# Patient Record
Sex: FEMALE | Race: WHITE | NOT HISPANIC OR LATINO | Employment: STUDENT | URBAN - NONMETROPOLITAN AREA
[De-identification: names, ages, dates, MRNs, and addresses within clinical notes are randomized per-mention and may not be internally consistent; named-entity substitution may affect disease eponyms.]

---
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
## 2019-04-07 ENCOUNTER — HOSPITAL ENCOUNTER (EMERGENCY)
Facility: HOSPITAL | Age: 19
Discharge: HOME | End: 2019-04-08
Attending: EMERGENCY MEDICINE | Admitting: EMERGENCY MEDICINE

## 2019-04-07 ENCOUNTER — APPOINTMENT (OUTPATIENT)
Dept: GENERAL RADIOLOGY | Facility: HOSPITAL | Age: 19
End: 2019-04-07

## 2019-04-07 DIAGNOSIS — J18.9 PNEUMONIA OF RIGHT UPPER LOBE DUE TO INFECTIOUS ORGANISM: Primary | ICD-10-CM

## 2019-04-07 LAB
ALBUMIN SERPL-MCNC: 4.7 G/DL (ref 3.5–5)
ALBUMIN/GLOB SERPL: 1.3 G/DL (ref 1–2)
ALP SERPL-CCNC: 80 U/L (ref 38–126)
ALT SERPL W P-5'-P-CCNC: 27 U/L (ref 13–69)
ANION GAP SERPL CALCULATED.3IONS-SCNC: 19 MMOL/L (ref 10–20)
AST SERPL-CCNC: 29 U/L (ref 15–46)
B-HCG UR QL: NEGATIVE
BACTERIA UR QL AUTO: ABNORMAL /HPF
BASOPHILS # BLD AUTO: 0.02 10*3/MM3 (ref 0–0.2)
BASOPHILS NFR BLD AUTO: 0.3 % (ref 0–1.5)
BILIRUB SERPL-MCNC: 1.4 MG/DL (ref 0.2–1.3)
BILIRUB UR QL STRIP: ABNORMAL
BUN BLD-MCNC: 10 MG/DL (ref 7–20)
BUN/CREAT SERPL: 14.3 (ref 7.1–23.5)
CALCIUM SPEC-SCNC: 9.9 MG/DL (ref 8.4–10.2)
CHLORIDE SERPL-SCNC: 96 MMOL/L (ref 98–107)
CLARITY UR: CLEAR
CO2 SERPL-SCNC: 26 MMOL/L (ref 26–30)
COLOR UR: ABNORMAL
CREAT BLD-MCNC: 0.7 MG/DL (ref 0.6–1.3)
DEPRECATED RDW RBC AUTO: 38.5 FL (ref 37–54)
EOSINOPHIL # BLD AUTO: 0 10*3/MM3 (ref 0–0.4)
EOSINOPHIL NFR BLD AUTO: 0 % (ref 0.3–6.2)
ERYTHROCYTE [DISTWIDTH] IN BLOOD BY AUTOMATED COUNT: 11.1 % (ref 12.3–15.4)
FLUAV AG NPH QL: NEGATIVE
FLUBV AG NPH QL IA: NEGATIVE
GFR SERPL CREATININE-BSD FRML MDRD: 107 ML/MIN/1.73
GLOBULIN UR ELPH-MCNC: 3.6 GM/DL
GLUCOSE BLD-MCNC: 91 MG/DL (ref 74–98)
GLUCOSE UR STRIP-MCNC: NEGATIVE MG/DL
HCT VFR BLD AUTO: 36.6 % (ref 34–46.6)
HETEROPH AB SER QL LA: NEGATIVE
HGB BLD-MCNC: 12.6 G/DL (ref 12–15.9)
HGB UR QL STRIP.AUTO: NEGATIVE
HYALINE CASTS UR QL AUTO: ABNORMAL /LPF
IMM GRANULOCYTES # BLD AUTO: 0.03 10*3/MM3 (ref 0–0.05)
IMM GRANULOCYTES NFR BLD AUTO: 0.5 % (ref 0–0.5)
KETONES UR QL STRIP: ABNORMAL
LEUKOCYTE ESTERASE UR QL STRIP.AUTO: ABNORMAL
LIPASE SERPL-CCNC: 214 U/L (ref 23–300)
LYMPHOCYTES # BLD AUTO: 0.87 10*3/MM3 (ref 0.7–3.1)
LYMPHOCYTES NFR BLD AUTO: 13.9 % (ref 19.6–45.3)
MCH RBC QN AUTO: 32.5 PG (ref 26.6–33)
MCHC RBC AUTO-ENTMCNC: 34.4 G/DL (ref 31.5–35.7)
MCV RBC AUTO: 94.3 FL (ref 79–97)
MONOCYTES # BLD AUTO: 0.73 10*3/MM3 (ref 0.1–0.9)
MONOCYTES NFR BLD AUTO: 11.6 % (ref 5–12)
MUCOUS THREADS URNS QL MICRO: ABNORMAL /HPF
NEUTROPHILS # BLD AUTO: 4.62 10*3/MM3 (ref 1.4–7)
NEUTROPHILS NFR BLD AUTO: 73.7 % (ref 42.7–76)
NITRITE UR QL STRIP: NEGATIVE
NRBC BLD AUTO-RTO: 0 /100 WBC (ref 0–0)
PH UR STRIP.AUTO: 6.5 [PH] (ref 5–8)
PLATELET # BLD AUTO: 195 10*3/MM3 (ref 140–450)
PMV BLD AUTO: 10.4 FL (ref 6–12)
POTASSIUM BLD-SCNC: 4 MMOL/L (ref 3.5–5.1)
PROT SERPL-MCNC: 8.3 G/DL (ref 6.3–8.2)
PROT UR QL STRIP: ABNORMAL
RBC # BLD AUTO: 3.88 10*6/MM3 (ref 3.77–5.28)
RBC # UR: ABNORMAL /HPF
REF LAB TEST METHOD: ABNORMAL
S PYO AG THROAT QL: NEGATIVE
SODIUM BLD-SCNC: 137 MMOL/L (ref 137–145)
SP GR UR STRIP: 1.02 (ref 1–1.03)
SQUAMOUS #/AREA URNS HPF: ABNORMAL /HPF
UROBILINOGEN UR QL STRIP: ABNORMAL
WBC NRBC COR # BLD: 6.27 10*3/MM3 (ref 3.4–10.8)
WBC UR QL AUTO: ABNORMAL /HPF

## 2019-04-07 PROCEDURE — 87804 INFLUENZA ASSAY W/OPTIC: CPT | Performed by: PHYSICIAN ASSISTANT

## 2019-04-07 PROCEDURE — 71046 X-RAY EXAM CHEST 2 VIEWS: CPT

## 2019-04-07 PROCEDURE — 99284 EMERGENCY DEPT VISIT MOD MDM: CPT

## 2019-04-07 PROCEDURE — 83690 ASSAY OF LIPASE: CPT | Performed by: PHYSICIAN ASSISTANT

## 2019-04-07 PROCEDURE — 96366 THER/PROPH/DIAG IV INF ADDON: CPT

## 2019-04-07 PROCEDURE — 80053 COMPREHEN METABOLIC PANEL: CPT | Performed by: PHYSICIAN ASSISTANT

## 2019-04-07 PROCEDURE — 86308 HETEROPHILE ANTIBODY SCREEN: CPT | Performed by: PHYSICIAN ASSISTANT

## 2019-04-07 PROCEDURE — 85025 COMPLETE CBC W/AUTO DIFF WBC: CPT | Performed by: PHYSICIAN ASSISTANT

## 2019-04-07 PROCEDURE — 96365 THER/PROPH/DIAG IV INF INIT: CPT

## 2019-04-07 PROCEDURE — 96375 TX/PRO/DX INJ NEW DRUG ADDON: CPT

## 2019-04-07 PROCEDURE — 87880 STREP A ASSAY W/OPTIC: CPT | Performed by: PHYSICIAN ASSISTANT

## 2019-04-07 PROCEDURE — 25010000002 LEVOFLOXACIN PER 250 MG: Performed by: PHYSICIAN ASSISTANT

## 2019-04-07 PROCEDURE — 81001 URINALYSIS AUTO W/SCOPE: CPT | Performed by: PHYSICIAN ASSISTANT

## 2019-04-07 PROCEDURE — 94640 AIRWAY INHALATION TREATMENT: CPT

## 2019-04-07 PROCEDURE — 25010000002 METHYLPREDNISOLONE PER 125 MG: Performed by: PHYSICIAN ASSISTANT

## 2019-04-07 PROCEDURE — 87081 CULTURE SCREEN ONLY: CPT | Performed by: PHYSICIAN ASSISTANT

## 2019-04-07 PROCEDURE — 81025 URINE PREGNANCY TEST: CPT | Performed by: PHYSICIAN ASSISTANT

## 2019-04-07 RX ORDER — IPRATROPIUM BROMIDE AND ALBUTEROL SULFATE 2.5; .5 MG/3ML; MG/3ML
3 SOLUTION RESPIRATORY (INHALATION) ONCE
Status: COMPLETED | OUTPATIENT
Start: 2019-04-07 | End: 2019-04-07

## 2019-04-07 RX ORDER — IBUPROFEN 200 MG
400 TABLET ORAL ONCE
Status: COMPLETED | OUTPATIENT
Start: 2019-04-07 | End: 2019-04-07

## 2019-04-07 RX ORDER — LEVOFLOXACIN 5 MG/ML
750 INJECTION, SOLUTION INTRAVENOUS ONCE
Status: COMPLETED | OUTPATIENT
Start: 2019-04-07 | End: 2019-04-08

## 2019-04-07 RX ORDER — SENNOSIDES 8.6 MG
650 CAPSULE ORAL EVERY 8 HOURS PRN
Qty: 12 TABLET | Refills: 0 | OUTPATIENT
Start: 2019-04-07 | End: 2020-05-04

## 2019-04-07 RX ORDER — PREDNISONE 20 MG/1
20 TABLET ORAL 2 TIMES DAILY
Qty: 10 TABLET | Refills: 0 | OUTPATIENT
Start: 2019-04-07 | End: 2020-05-04

## 2019-04-07 RX ORDER — ACETAMINOPHEN 325 MG/1
650 TABLET ORAL ONCE
Status: COMPLETED | OUTPATIENT
Start: 2019-04-07 | End: 2019-04-07

## 2019-04-07 RX ORDER — SODIUM CHLORIDE 0.9 % (FLUSH) 0.9 %
10 SYRINGE (ML) INJECTION AS NEEDED
Status: DISCONTINUED | OUTPATIENT
Start: 2019-04-07 | End: 2019-04-08 | Stop reason: HOSPADM

## 2019-04-07 RX ORDER — METHYLPREDNISOLONE SODIUM SUCCINATE 125 MG/2ML
125 INJECTION, POWDER, LYOPHILIZED, FOR SOLUTION INTRAMUSCULAR; INTRAVENOUS ONCE
Status: COMPLETED | OUTPATIENT
Start: 2019-04-07 | End: 2019-04-07

## 2019-04-07 RX ORDER — LEVOFLOXACIN 750 MG/1
750 TABLET ORAL DAILY
Qty: 10 TABLET | Refills: 0 | OUTPATIENT
Start: 2019-04-07 | End: 2020-05-04

## 2019-04-07 RX ADMIN — ACETAMINOPHEN 650 MG: 325 TABLET, FILM COATED ORAL at 22:06

## 2019-04-07 RX ADMIN — SODIUM CHLORIDE 1000 ML: 9 INJECTION, SOLUTION INTRAVENOUS at 21:23

## 2019-04-07 RX ADMIN — LEVOFLOXACIN 750 MG: 5 INJECTION, SOLUTION INTRAVENOUS at 22:06

## 2019-04-07 RX ADMIN — IPRATROPIUM BROMIDE AND ALBUTEROL SULFATE 3 ML: .5; 3 SOLUTION RESPIRATORY (INHALATION) at 21:35

## 2019-04-07 RX ADMIN — IBUPROFEN 400 MG: 200 TABLET, FILM COATED ORAL at 22:06

## 2019-04-07 RX ADMIN — METHYLPREDNISOLONE SODIUM SUCCINATE 125 MG: 125 INJECTION, POWDER, FOR SOLUTION INTRAMUSCULAR; INTRAVENOUS at 22:06

## 2019-04-07 NOTE — ED PROVIDER NOTES
Subjective   The patient is here with her family member with reported cough congestion some decreased energy, fatigue no nausea vomiting reported some subjective fever chills patient has been treated for suspected strep pharyngitis also bronchitis patient apparently has missed school this week presents here for further evaluation no abdominal pains reported        History provided by:  Patient      Review of Systems   Constitutional: Positive for activity change and fever.   HENT: Positive for congestion, rhinorrhea and sore throat.    Eyes: Negative.    Respiratory: Positive for cough. Negative for shortness of breath.    Cardiovascular: Negative for chest pain and leg swelling.   Gastrointestinal: Negative.  Negative for abdominal pain, diarrhea and vomiting.   Genitourinary: Negative.    Musculoskeletal: Positive for myalgias. Negative for neck pain and neck stiffness.   Skin: Negative.    Neurological: Negative.    Psychiatric/Behavioral: Negative.    All other systems reviewed and are negative.      Past Medical History:   Diagnosis Date   • Gallstones        Allergies   Allergen Reactions   • Penicillins        Past Surgical History:   Procedure Laterality Date   • CHOLECYSTECTOMY         Family History   Problem Relation Age of Onset   • Migraines Mother    • Osteoporosis Maternal Grandmother    • Stroke Maternal Grandmother    • Diabetes Maternal Grandfather    • Heart attack Maternal Grandfather    • Hypertension Maternal Grandfather    • Stroke Paternal Grandmother    • Diabetes Cousin    • Hypertension Father        Social History     Socioeconomic History   • Marital status: Single     Spouse name: Not on file   • Number of children: Not on file   • Years of education: Not on file   • Highest education level: Not on file   Tobacco Use   • Smoking status: Never Smoker   • Smokeless tobacco: Never Used   Substance and Sexual Activity   • Alcohol use: No   • Drug use: No   • Sexual activity: No            Objective   Physical Exam   Constitutional: She is oriented to person, place, and time. She appears well-developed and well-nourished. No distress.   Nontoxic well-appearing smiling no acute distress   HENT:   Head: Normocephalic and atraumatic.   Right Ear: External ear normal.   Left Ear: External ear normal.   Mouth/Throat: Oropharynx is clear and moist. No oropharyngeal exudate.   Eyes: Conjunctivae and EOM are normal. Pupils are equal, round, and reactive to light.   Neck: Normal range of motion. Neck supple.   No meningismus   Cardiovascular: Regular rhythm and intact distal pulses.   Pulmonary/Chest: Effort normal and breath sounds normal.   Abdominal: Soft. Bowel sounds are normal. There is no tenderness. There is no guarding.   Musculoskeletal: Normal range of motion. She exhibits no edema.   Lymphadenopathy:     She has no cervical adenopathy.   Neurological: She is alert and oriented to person, place, and time. No cranial nerve deficit or sensory deficit. She exhibits normal muscle tone. Coordination normal.   Skin: Skin is warm and dry. Capillary refill takes less than 2 seconds. No rash noted. She is not diaphoretic.   Psychiatric: She has a normal mood and affect. Her behavior is normal. Judgment and thought content normal.   Nursing note and vitals reviewed.      Procedures           ED Course  ED Course as of Apr 07 2224   Sun Apr 07, 2019 2003 Patient has now consented to do some laboratory assessment she had talked with family members before deciding will proceed with further evaluation  [SC]   2046 Patient is resting comfortably no acute distress  [SC]   2115 Patient's laboratory workup essentially reassuring,  Chest x-ray per radiology demonstrates a right upper lobe pneumonia... Patient has finished a Zithromax recently will start patient on Levaquin recommend close follow-up, [facility] or ER if symptoms do not improve or worsen    [SC]   2134 Pt case labs management reviewed  Dr Comer  [SC]   4731 Patient continues feeling well we will treat as mentioned with antibiotics discharged home under aunts care  Work excuse for the next 2 days  [SC]      ED Course User Index  [SC] Filemon Dolan PA-C                  MDM  Number of Diagnoses or Management Options     Amount and/or Complexity of Data Reviewed  Review and summarize past medical records: yes  Discuss the patient with other providers: yes    Risk of Complications, Morbidity, and/or Mortality  Presenting problems: low  Diagnostic procedures: low  Management options: low          Final diagnoses:   Pneumonia of right upper lobe due to infectious organism (CMS/[facility])            Filemon Dolan PA-C  04/07/19 2216       Filemon Dolan PA-C  04/07/19 2228

## 2019-04-08 VITALS
BODY MASS INDEX: 22.55 KG/M2 | WEIGHT: 148.8 LBS | RESPIRATION RATE: 18 BRPM | OXYGEN SATURATION: 96 % | HEART RATE: 92 BPM | TEMPERATURE: 98.6 F | DIASTOLIC BLOOD PRESSURE: 69 MMHG | HEIGHT: 68 IN | SYSTOLIC BLOOD PRESSURE: 118 MMHG

## 2019-04-09 ENCOUNTER — OFFICE VISIT (OUTPATIENT)
Dept: INTERNAL MEDICINE | Facility: CLINIC | Age: 19
End: 2019-04-09

## 2019-04-09 VITALS
OXYGEN SATURATION: 95 % | HEART RATE: 89 BPM | HEIGHT: 68 IN | DIASTOLIC BLOOD PRESSURE: 62 MMHG | SYSTOLIC BLOOD PRESSURE: 116 MMHG | BODY MASS INDEX: 22.58 KG/M2 | TEMPERATURE: 98.2 F | WEIGHT: 149 LBS

## 2019-04-09 DIAGNOSIS — J18.9 PNEUMONIA OF RIGHT UPPER LOBE DUE TO INFECTIOUS ORGANISM: Primary | ICD-10-CM

## 2019-04-09 PROCEDURE — 99213 OFFICE O/P EST LOW 20 MIN: CPT | Performed by: PHYSICIAN ASSISTANT

## 2019-04-09 NOTE — PROGRESS NOTES
Chief Complaint   Patient presents with   • Follow-up     Pt went to [facility] ER due to cough congestion some decreased energy, fatigue. Pt was given DX of pneumonia.        Subjective   Araseli Martins is a 19 y.o. female    History of Present Illness     Patient evaluated at [facility] on 4/7.  Strep and flu negative, labs and chest x-ray reviewed and she was diagnosed with right upper lobe pneumonia.  She was started on Levaquin 750 mg daily and prednisone.  Prior to diagnosis of pneumonia patient was treated for strep pharyngitis and bronchitis.  She completed Medrol Dosepak and azithromycin.  She presents today for follow-up on pneumonia.  She reports that since starting the medication she has noted improved energy.  She continues to have occasional cough and shortness of breath associated with the cough.  She denies any sputum production.  She has an albuterol inhaler at home but has not used it yet.      Past Medical History:   Diagnosis Date   • Gallstones      Past Surgical History:   Procedure Laterality Date   • CHOLECYSTECTOMY       Family History   Problem Relation Age of Onset   • Migraines Mother    • Osteoporosis Maternal Grandmother    • Stroke Maternal Grandmother    • Diabetes Maternal Grandfather    • Heart attack Maternal Grandfather    • Hypertension Maternal Grandfather    • Stroke Paternal Grandmother    • Diabetes Cousin    • Hypertension Father      Social History     Socioeconomic History   • Marital status: Single     Spouse name: Not on file   • Number of children: Not on file   • Years of education: Not on file   • Highest education level: Not on file   Tobacco Use   • Smoking status: Never Smoker   • Smokeless tobacco: Never Used   Substance and Sexual Activity   • Alcohol use: No   • Drug use: No   • Sexual activity: No     Allergies   Allergen Reactions   • Penicillins          Review of Systems   Constitutional: Negative for activity change, appetite change, chills,  diaphoresis, fatigue, fever, unexpected weight gain and unexpected weight loss.   HENT: Negative for congestion.    Respiratory: Positive for cough and shortness of breath. Negative for choking, chest tightness and wheezing.    Cardiovascular: Negative for chest pain and leg swelling.   Gastrointestinal: Negative for abdominal pain, constipation, diarrhea, nausea and vomiting.   Musculoskeletal: Negative for back pain.   Allergic/Immunologic: Negative for environmental allergies, food allergies and immunocompromised state.   Neurological: Negative for dizziness and light-headedness.     Objective     Vitals:    04/09/19 1404   BP: 116/62   Pulse: 89   Temp: 98.2 °F (36.8 °C)   SpO2: 95%       Physical Exam   Constitutional: She is oriented to person, place, and time. She appears well-developed and well-nourished. No distress.   HENT:   Head: Normocephalic and atraumatic.   Eyes: Conjunctivae and EOM are normal. Pupils are equal, round, and reactive to light.   Neck: Normal range of motion. Neck supple.   Cardiovascular: Normal rate, regular rhythm and normal heart sounds. Exam reveals no gallop and no friction rub.   No murmur heard.  Pulmonary/Chest: Effort normal and breath sounds normal. No respiratory distress. She has no wheezes. She has no rales.   Musculoskeletal: Normal range of motion. She exhibits no edema.   Neurological: She is alert and oriented to person, place, and time.   Skin: Skin is warm and dry. Capillary refill takes less than 2 seconds. She is not diaphoretic.   Psychiatric: She has a normal mood and affect. Her behavior is normal.   Nursing note and vitals reviewed.      Results for orders placed or performed during the hospital encounter of 04/07/19   Rapid Strep A Screen - Swab, Throat   Result Value Ref Range    Strep A Ag Negative Negative   Influenza Antigen, Rapid - Swab, Nasopharynx   Result Value Ref Range    Influenza A Ag, EIA Negative Negative    Influenza B Ag, EIA Negative  Negative   Comprehensive Metabolic Panel   Result Value Ref Range    Glucose 91 74 - 98 mg/dL    BUN 10 7 - 20 mg/dL    Creatinine 0.70 0.60 - 1.30 mg/dL    Sodium 137 137 - 145 mmol/L    Potassium 4.0 3.5 - 5.1 mmol/L    Chloride 96 (L) 98 - 107 mmol/L    CO2 26.0 26.0 - 30.0 mmol/L    Calcium 9.9 8.4 - 10.2 mg/dL    Total Protein 8.3 (H) 6.3 - 8.2 g/dL    Albumin 4.70 3.50 - 5.00 g/dL    ALT (SGPT) 27 13 - 69 U/L    AST (SGOT) 29 15 - 46 U/L    Alkaline Phosphatase 80 38 - 126 U/L    Total Bilirubin 1.4 (H) 0.2 - 1.3 mg/dL    eGFR Non African Amer 107 >60 mL/min/1.73    Globulin 3.6 gm/dL    A/G Ratio 1.3 1.0 - 2.0 g/dL    BUN/Creatinine Ratio 14.3 7.1 - 23.5    Anion Gap 19.0 10.0 - 20.0 mmol/L   Lipase   Result Value Ref Range    Lipase 214 23 - 300 U/L   Mononucleosis Screen   Result Value Ref Range    Monospot Negative Negative   Urinalysis With Culture If Indicated - Urine, Clean Catch   Result Value Ref Range    Color, UA Dark Yellow (A) Yellow, Straw    Appearance, UA Clear Clear    pH, UA 6.5 5.0 - 8.0    Specific Gravity, UA 1.025 1.005 - 1.030    Glucose, UA Negative Negative    Ketones, UA 40 mg/dL (2+) (A) Negative    Bilirubin, UA Small (1+) (A) Negative    Blood, UA Negative Negative    Protein, UA Trace (A) Negative    Leuk Esterase, UA Trace (A) Negative    Nitrite, UA Negative Negative    Urobilinogen, UA 1.0 E.U./dL 0.2 - 1.0 E.U./dL   Pregnancy, Urine - Urine, Clean Catch   Result Value Ref Range    HCG, Urine QL Negative Negative   CBC Auto Differential   Result Value Ref Range    WBC 6.27 3.40 - 10.80 10*3/mm3    RBC 3.88 3.77 - 5.28 10*6/mm3    Hemoglobin 12.6 12.0 - 15.9 g/dL    Hematocrit 36.6 34.0 - 46.6 %    MCV 94.3 79.0 - 97.0 fL    MCH 32.5 26.6 - 33.0 pg    MCHC 34.4 31.5 - 35.7 g/dL    RDW 11.1 (L) 12.3 - 15.4 %    RDW-SD 38.5 37.0 - 54.0 fl    MPV 10.4 6.0 - 12.0 fL    Platelets 195 140 - 450 10*3/mm3    Neutrophil % 73.7 42.7 - 76.0 %    Lymphocyte % 13.9 (L) 19.6 - 45.3 %     Monocyte % 11.6 5.0 - 12.0 %    Eosinophil % 0.0 (L) 0.3 - 6.2 %    Basophil % 0.3 0.0 - 1.5 %    Immature Grans % 0.5 0.0 - 0.5 %    Neutrophils, Absolute 4.62 1.40 - 7.00 10*3/mm3    Lymphocytes, Absolute 0.87 0.70 - 3.10 10*3/mm3    Monocytes, Absolute 0.73 0.10 - 0.90 10*3/mm3    Eosinophils, Absolute 0.00 0.00 - 0.40 10*3/mm3    Basophils, Absolute 0.02 0.00 - 0.20 10*3/mm3    Immature Grans, Absolute 0.03 0.00 - 0.05 10*3/mm3    nRBC 0.0 0.0 - 0.0 /100 WBC   Urinalysis, Microscopic Only - Urine, Clean Catch   Result Value Ref Range    RBC, UA 0-2 (A) None Seen /HPF    WBC, UA 3-5 (A) None Seen /HPF    Bacteria, UA 1+ (A) None Seen /HPF    Squamous Epithelial Cells, UA 0-2 None Seen, 0-2 /HPF    Hyaline Casts, UA None Seen None Seen /LPF    Mucus, UA Large/3+ (A) None Seen, Trace /HPF    Methodology Manual Light Microscopy        Assessment/Plan     Araseli was seen today for follow-up.    Diagnoses and all orders for this visit:    Pneumonia of right upper lobe due to infectious organism (CMS/[facility])  Chest x-ray reviewed, consistent with right upper lobe pneumonia.  Patient reports symptoms are significantly better since she started antibiotics on Sunday.  Continue Levaquin and prednisone.  Have encouraged patient to use albuterol inhaler as needed for shortness of breath.  Continue to push water and rest.  Return to clinic if symptoms are not improving or if they worsen.      Return if symptoms worsen or fail to improve.    Priya Ley PA-C

## 2019-04-10 LAB — BACTERIA SPEC AEROBE CULT: NORMAL

## 2020-07-09 PROCEDURE — U0004 COV-19 TEST NON-CDC HGH THRU: HCPCS | Performed by: PHYSICIAN ASSISTANT

## 2020-07-09 PROCEDURE — U0002 COVID-19 LAB TEST NON-CDC: HCPCS | Performed by: PHYSICIAN ASSISTANT

## 2021-10-04 ENCOUNTER — OFFICE VISIT (OUTPATIENT)
Dept: INTERNAL MEDICINE | Facility: CLINIC | Age: 21
End: 2021-10-04

## 2021-10-04 VITALS
WEIGHT: 171 LBS | HEART RATE: 77 BPM | HEIGHT: 70 IN | OXYGEN SATURATION: 99 % | TEMPERATURE: 98.6 F | SYSTOLIC BLOOD PRESSURE: 137 MMHG | DIASTOLIC BLOOD PRESSURE: 87 MMHG | BODY MASS INDEX: 24.48 KG/M2 | RESPIRATION RATE: 15 BRPM

## 2021-10-04 DIAGNOSIS — R17 ELEVATED BILIRUBIN: ICD-10-CM

## 2021-10-04 DIAGNOSIS — Z13.21 SCREENING FOR ENDOCRINE, NUTRITIONAL, METABOLIC AND IMMUNITY DISORDER: ICD-10-CM

## 2021-10-04 DIAGNOSIS — R19.7 DIARRHEA, UNSPECIFIED TYPE: ICD-10-CM

## 2021-10-04 DIAGNOSIS — Z13.0 SCREENING FOR ENDOCRINE, NUTRITIONAL, METABOLIC AND IMMUNITY DISORDER: ICD-10-CM

## 2021-10-04 DIAGNOSIS — Z13.29 SCREENING FOR ENDOCRINE, NUTRITIONAL, METABOLIC AND IMMUNITY DISORDER: ICD-10-CM

## 2021-10-04 DIAGNOSIS — Z11.59 NEED FOR HEPATITIS C SCREENING TEST: ICD-10-CM

## 2021-10-04 DIAGNOSIS — Z00.00 PHYSICAL EXAM, ANNUAL: Primary | ICD-10-CM

## 2021-10-04 DIAGNOSIS — Z13.228 SCREENING FOR ENDOCRINE, NUTRITIONAL, METABOLIC AND IMMUNITY DISORDER: ICD-10-CM

## 2021-10-04 PROCEDURE — 99395 PREV VISIT EST AGE 18-39: CPT | Performed by: NURSE PRACTITIONER

## 2021-10-04 RX ORDER — DICYCLOMINE HYDROCHLORIDE 10 MG/1
10 CAPSULE ORAL
Qty: 90 CAPSULE | Refills: 1 | Status: SHIPPED | OUTPATIENT
Start: 2021-10-04 | End: 2021-11-23

## 2021-10-04 NOTE — PATIENT INSTRUCTIONS

## 2021-10-04 NOTE — PROGRESS NOTES
Chief Complaint   Patient presents with   • Abdominal Pain     nausea and diarrhea. x 8 years. no blood.    • Annual Exam       Araseli Martins is a 21 y.o. female and is here for a comprehensive physical exam and to reestablish care. The patient reports problems - Which include stomach related issues as she has diarrhea and states that she has noticed that certain foods have made her symptoms worse and she has tried to stay away from that.  She has been checked for celiac in the past along with H. pylori and also had gastrointestinal panel.  She denies any history of irritable bowel that she is aware of and does not feel that it is stress related.  She has noticed correlation with certain foods but cannot pinpoint what is making it worse.  She does have family history of celiac disease.  Denies any blood in stool currently..  Patient denies any fever or chills.  She does not have a gallbladder.  Patient denies any substance abuse and does report that she drinks about 3 glasses of alcohol per week.     History:  LMP: Patient's last menstrual period was 2021.  Last pap date:not reported   Abnormal pap? no  : 0  Para: 0  STD:none  Age of menarche:11  Sexually active:17 (males)  Abuse:none  Has boyfriend  Do you take any herbs or supplements that were not prescribed by a doctor? no  Are you taking calcium supplements? no  Are you taking aspirin daily? no      Health Habits:  Dental Exam. not up to date - Advised patient to schedule  Eye Exam. not up to date - Advised patient to schedule  Exercise: 5 times/week.  Current exercise activities include: walking    Health Maintenance   Topic Date Due   • ANNUAL PHYSICAL  Never done   • COVID-19 Vaccine (1) Never done   • HPV VACCINES (2 - 3-dose series) 2015   • HEPATITIS C SCREENING  Never done   • PAP SMEAR  Never done   • TDAP/TD VACCINES (2 - Td or Tdap) 2020   • INFLUENZA VACCINE  Never done   • Pneumococcal Vaccine 0-64  Completed   •  MENINGOCOCCAL VACCINE  Completed       PMH, PSH, SocHx, FamHx, Allergies, and Medications: Reviewed and updated in the Visit Navigator.     Allergies   Allergen Reactions   • Penicillins Other (See Comments)     Unsure of reaction      Past Medical History:   Diagnosis Date   • Gallstones      Past Surgical History:   Procedure Laterality Date   • CHOLECYSTECTOMY       Social History     Socioeconomic History   • Marital status: Single     Spouse name: Not on file   • Number of children: Not on file   • Years of education: Not on file   • Highest education level: Not on file   Tobacco Use   • Smoking status: Never Smoker   • Smokeless tobacco: Never Used   Vaping Use   • Vaping Use: Never used   Substance and Sexual Activity   • Alcohol use: No   • Drug use: No   • Sexual activity: Never     Family History   Problem Relation Age of Onset   • Migraines Mother    • Osteoporosis Maternal Grandmother    • Stroke Maternal Grandmother    • Diabetes Maternal Grandfather    • Heart attack Maternal Grandfather    • Hypertension Maternal Grandfather    • Stroke Paternal Grandmother    • Diabetes Cousin    • Hypertension Father        Review of Systems  Review of Systems   Constitutional: Positive for fatigue. Negative for chills, fever, unexpected weight gain and unexpected weight loss.   HENT: Negative for congestion, ear pain, hearing loss, rhinorrhea, sinus pressure, sneezing, sore throat and trouble swallowing.    Eyes: Negative for discharge and itching.   Respiratory: Negative for cough, chest tightness and shortness of breath.    Cardiovascular: Negative for chest pain, palpitations and leg swelling.   Gastrointestinal: Positive for abdominal pain and diarrhea. Negative for blood in stool, constipation and vomiting.   Endocrine: Negative for polydipsia and polyuria.   Genitourinary: Negative for difficulty urinating, dysuria, frequency, hematuria, urgency and urinary incontinence.   Musculoskeletal: Negative for  "arthralgias, back pain, gait problem and joint swelling.   Skin: Negative for rash and bruise.   Allergic/Immunologic: Positive for environmental allergies. Negative for immunocompromised state.   Neurological: Negative for dizziness, syncope, weakness, light-headedness, numbness and headache.   Hematological: Does not bruise/bleed easily.   Psychiatric/Behavioral: Positive for stress. Negative for behavioral problems, dysphoric mood and sleep disturbance. The patient is nervous/anxious.          Vitals:    10/04/21 1559   BP: 137/87   Pulse: 77   Resp: 15   Temp: 98.6 °F (37 °C)   SpO2: 99%       Objective   /87   Pulse 77   Temp 98.6 °F (37 °C)   Resp 15   Ht 177.8 cm (70\")   Wt 77.6 kg (171 lb)   LMP 09/13/2021   SpO2 99%   BMI 24.54 kg/m²   PHQ-9 Depression Screening  Little interest or pleasure in doing things? 1   Feeling down, depressed, or hopeless? 1   Trouble falling or staying asleep, or sleeping too much? 2   Feeling tired or having little energy? 2   Poor appetite or overeating? 0   Feeling bad about yourself - or that you are a failure or have let yourself or your family down? 1   Trouble concentrating on things, such as reading the newspaper or watching television? 2   Moving or speaking so slowly that other people could have noticed? Or the opposite - being so fidgety or restless that you have been moving around a lot more than usual? 0   Thoughts that you would be better off dead, or of hurting yourself in some way? 0   PHQ-9 Total Score 9   If you checked off any problems, how difficult have these problems made it for you to do your work, take care of things at home, or get along with other people? Not difficult at all     Anxiety SANDRA-7 10/4/2021   Feeling nervous, anxious or on edge 1   Not being able to stop or control worrying 1   Worrying too much about different things 1   Trouble Relaxing 1   Being so restless that it is hard to sit still 0   Becoming easily annoyed or irritable " 1   Feeling afraid as if something awful might happen 1   SANDRA 7 Total Score 6   If you checked any problems, how difficult have these problems made it for you to do your work, take care of things at home, or get along with other people Somewhat difficult     Physical Exam  Vitals and nursing note reviewed.   Constitutional:       General: She is not in acute distress.     Appearance: Normal appearance. She is well-developed.   HENT:      Head: Normocephalic and atraumatic.      Right Ear: Hearing, ear canal and external ear normal. Tympanic membrane is erythematous and bulging.      Left Ear: Hearing, ear canal and external ear normal. Tympanic membrane is erythematous and bulging.      Nose: Mucosal edema present. No rhinorrhea.      Right Sinus: No maxillary sinus tenderness or frontal sinus tenderness.      Left Sinus: No maxillary sinus tenderness or frontal sinus tenderness.      Mouth/Throat:      Mouth: Mucous membranes are dry.      Dentition: Normal dentition.      Pharynx: Posterior oropharyngeal erythema present.      Comments: PND    Eyes:      Conjunctiva/sclera: Conjunctivae normal.      Pupils: Pupils are equal, round, and reactive to light.   Neck:      Thyroid: No thyroid mass or thyromegaly.      Vascular: No carotid bruit or JVD.   Cardiovascular:      Rate and Rhythm: Normal rate and regular rhythm.      Heart sounds: Normal heart sounds, S1 normal and S2 normal. No murmur heard.     Pulmonary:      Effort: Pulmonary effort is normal. No respiratory distress.      Breath sounds: Normal breath sounds.   Abdominal:      General: Bowel sounds are normal. There is no distension or abdominal bruit.      Palpations: Abdomen is soft. There is no mass.      Tenderness: There is generalized abdominal tenderness and tenderness in the right lower quadrant. There is no right CVA tenderness, left CVA tenderness, guarding or rebound. Negative signs include Coy's sign, Rovsing's sign, McBurney's sign, psoas  sign and obturator sign.      Hernia: No hernia is present.   Genitourinary:     Comments: deferred  Musculoskeletal:         General: Normal range of motion.      Cervical back: Normal range of motion and neck supple.   Lymphadenopathy:      Head:      Right side of head: No submental, submandibular or tonsillar adenopathy.      Left side of head: No submental, submandibular or tonsillar adenopathy.      Cervical: No cervical adenopathy.   Skin:     General: Skin is warm and dry.      Capillary Refill: Capillary refill takes less than 2 seconds.      Findings: No rash.      Nails: There is no clubbing.   Neurological:      Mental Status: She is alert and oriented to person, place, and time.      Cranial Nerves: No cranial nerve deficit.      Sensory: No sensory deficit.      Gait: Gait normal.   Psychiatric:         Behavior: Behavior normal.         Thought Content: Thought content normal.         Judgment: Judgment normal.              Assessment/Plan   1. Healthy female exam.    2. Patient Counseling: Including but not Limited to the following, when appropriate:  --Nutrition: Stressed importance of moderation in sodium/caffeine intake, saturated fat and cholesterol, caloric balance, sufficient intake of fresh fruits, vegetables, fiber, calcium, iron, and 1 mg of folate supplement per day (for females capable of pregnancy).  --Discussed the issue of estrogen replacement, calcium supplement, and the daily use of baby aspirin.  --Exercise: Stressed the importance of regular exercise.   --Substance Abuse: Discussed cessation/primary prevention of tobacco, alcohol, or other drug use; driving or other dangerous activities under the influence; availability of treatment for abuse, as indicated based on social history.    --Sexuality: Discussed sexually transmitted diseases, partner selection, use of condoms, avoidance of unintended pregnancy  and contraceptive alternatives.   --Injury prevention: Discussed safety belts,  safety helmets, smoke detector, smoking near bedding or upholstery.   --Dental health: Discussed importance of regular tooth brushing, flossing, and dental visits.  --Immunizations reviewed.  --Discussed benefits of regular vision and dental screening       3. Discussed the patient's BMI with her.  The BMI is in the acceptable range  4. No follow-ups on file.  5. Age-appropriate Screening Scheduled      Assessment/Plan     Diagnoses and all orders for this visit:    1. Physical exam, annual (Primary)  -     Comprehensive metabolic panel  -     Lipid panel  -     CBC w AUTO Differential    2. Elevated bilirubin  -     Comprehensive metabolic panel  -     Ambulatory Referral to Gastroenterology    3. Diarrhea, unspecified type  -     Celiac Disease Panel  -     Inflammatory Bowel Disease Panel  -     dicyclomine (Bentyl) 10 MG capsule; Take 1 capsule by mouth 4 (Four) Times a Day Before Meals & at Bedtime.  Dispense: 90 capsule; Refill: 1  -     Ambulatory Referral to Gastroenterology    4. Screening for endocrine, nutritional, metabolic and immunity disorder  -     Hemoglobin A1c  -     TSH  -     T4, free  -     Vitamin B12  -     Vitamin D 25 hydroxy    5. Need for hepatitis C screening test  -     Hepatitis C Antibody                 Karlene Monterroso, APRN 10/04/2021

## 2021-10-18 LAB
25(OH)D3+25(OH)D2 SERPL-MCNC: 41.9 NG/ML (ref 30–100)
ALBUMIN SERPL-MCNC: 4.9 G/DL (ref 3.9–5)
ALBUMIN/GLOB SERPL: 1.8 {RATIO} (ref 1.2–2.2)
ALP SERPL-CCNC: 60 IU/L (ref 44–121)
ALT SERPL-CCNC: 15 IU/L (ref 0–32)
AST SERPL-CCNC: 13 IU/L (ref 0–40)
BAKER'S YEAST IGA QN: <20 UNITS (ref 0–24.9)
BAKER'S YEAST IGG QN: <20 UNITS (ref 0–24.9)
BASOPHILS # BLD AUTO: 0 X10E3/UL (ref 0–0.2)
BASOPHILS NFR BLD AUTO: 1 %
BILIRUB SERPL-MCNC: 2.1 MG/DL (ref 0–1.2)
BUN SERPL-MCNC: 8 MG/DL (ref 6–20)
BUN/CREAT SERPL: 11 (ref 9–23)
CALCIUM SERPL-MCNC: 10 MG/DL (ref 8.7–10.2)
CHLORIDE SERPL-SCNC: 102 MMOL/L (ref 96–106)
CHOLEST SERPL-MCNC: 192 MG/DL (ref 100–199)
CO2 SERPL-SCNC: 22 MMOL/L (ref 20–29)
CREAT SERPL-MCNC: 0.72 MG/DL (ref 0.57–1)
ENDOMYSIUM IGA SER QL: NEGATIVE
EOSINOPHIL # BLD AUTO: 0.1 X10E3/UL (ref 0–0.4)
EOSINOPHIL NFR BLD AUTO: 1 %
ERYTHROCYTE [DISTWIDTH] IN BLOOD BY AUTOMATED COUNT: 11.7 % (ref 11.7–15.4)
GLOBULIN SER CALC-MCNC: 2.8 G/DL (ref 1.5–4.5)
GLUCOSE SERPL-MCNC: 87 MG/DL (ref 65–99)
HBA1C MFR BLD: 4.9 % (ref 4.8–5.6)
HCT VFR BLD AUTO: 36.2 % (ref 34–46.6)
HCV AB S/CO SERPL IA: <0.1 S/CO RATIO (ref 0–0.9)
HDLC SERPL-MCNC: 68 MG/DL
HGB BLD-MCNC: 12 G/DL (ref 11.1–15.9)
IGA SERPL-MCNC: 159 MG/DL (ref 87–352)
IMM GRANULOCYTES # BLD AUTO: 0 X10E3/UL (ref 0–0.1)
IMM GRANULOCYTES NFR BLD AUTO: 0 %
LDLC SERPL CALC-MCNC: 104 MG/DL (ref 0–99)
LYMPHOCYTES # BLD AUTO: 2 X10E3/UL (ref 0.7–3.1)
LYMPHOCYTES NFR BLD AUTO: 35 %
MCH RBC QN AUTO: 31.4 PG (ref 26.6–33)
MCHC RBC AUTO-ENTMCNC: 33.1 G/DL (ref 31.5–35.7)
MCV RBC AUTO: 95 FL (ref 79–97)
MONOCYTES # BLD AUTO: 0.4 X10E3/UL (ref 0.1–0.9)
MONOCYTES NFR BLD AUTO: 7 %
NEUTROPHILS # BLD AUTO: 3.2 X10E3/UL (ref 1.4–7)
NEUTROPHILS NFR BLD AUTO: 56 %
P-ANCA ATYPICAL TITR SER IF: NORMAL TITER
PLATELET # BLD AUTO: 265 X10E3/UL (ref 150–450)
POTASSIUM SERPL-SCNC: 4.4 MMOL/L (ref 3.5–5.2)
PROT SERPL-MCNC: 7.7 G/DL (ref 6–8.5)
RBC # BLD AUTO: 3.82 X10E6/UL (ref 3.77–5.28)
SODIUM SERPL-SCNC: 139 MMOL/L (ref 134–144)
T4 FREE SERPL-MCNC: 1.28 NG/DL (ref 0.82–1.77)
TRIGL SERPL-MCNC: 115 MG/DL (ref 0–149)
TSH SERPL DL<=0.005 MIU/L-ACNC: 3.47 UIU/ML (ref 0.45–4.5)
TTG IGA SER-ACNC: <2 U/ML (ref 0–3)
VIT B12 SERPL-MCNC: 725 PG/ML (ref 232–1245)
VLDLC SERPL CALC-MCNC: 20 MG/DL (ref 5–40)
WBC # BLD AUTO: 5.8 X10E3/UL (ref 3.4–10.8)

## 2021-11-10 ENCOUNTER — OFFICE VISIT (OUTPATIENT)
Dept: GASTROENTEROLOGY | Facility: CLINIC | Age: 21
End: 2021-11-10

## 2021-11-10 VITALS
TEMPERATURE: 99.1 F | SYSTOLIC BLOOD PRESSURE: 119 MMHG | DIASTOLIC BLOOD PRESSURE: 73 MMHG | HEART RATE: 74 BPM | RESPIRATION RATE: 12 BRPM | BODY MASS INDEX: 25.33 KG/M2 | HEIGHT: 69 IN | WEIGHT: 171 LBS

## 2021-11-10 DIAGNOSIS — R17 ELEVATED BILIRUBIN: ICD-10-CM

## 2021-11-10 DIAGNOSIS — R10.9 ABDOMINAL CRAMPING: ICD-10-CM

## 2021-11-10 DIAGNOSIS — R11.0 NAUSEA: Primary | ICD-10-CM

## 2021-11-10 DIAGNOSIS — R19.7 DIARRHEA, UNSPECIFIED TYPE: ICD-10-CM

## 2021-11-10 PROCEDURE — 99203 OFFICE O/P NEW LOW 30 MIN: CPT | Performed by: PHYSICIAN ASSISTANT

## 2021-11-10 RX ORDER — L.ACIDOPHIL,PARACAS,B.ANIMALIS 12B CELL
1 CAPSULE ORAL DAILY
Qty: 10 CAPSULE | Refills: 0 | COMMUNITY
Start: 2021-11-10 | End: 2022-08-22

## 2021-11-10 NOTE — PATIENT INSTRUCTIONS
Low-FODMAP Eating Plan    FODMAP stands for fermentable oligosaccharides, disaccharides, monosaccharides, and polyols. These are sugars that are hard for some people to digest. A low-FODMAP eating plan may help some people who have irritable bowel syndrome (IBS) and certain other bowel (intestinal) diseases to manage their symptoms.  This meal plan can be complicated to follow. Work with a diet and nutrition specialist (dietitian) to make a low-FODMAP eating plan that is right for you. A dietitian can help make sure that you get enough nutrition from this diet.  What are tips for following this plan?  Reading food labels  · Check labels for hidden FODMAPs such as:  ? High-fructose syrup.  ? Honey.  ? Agave.  ? Natural fruit flavors.  ? Onion or garlic powder.  · Choose low-FODMAP foods that contain 3-4 grams of fiber per serving.  · Check food labels for serving sizes. Eat only one serving at a time to make sure FODMAP levels stay low.  Shopping  · Shop with a list of foods that are recommended on this diet and make a meal plan.  Meal planning  · Follow a low-FODMAP eating plan for up to 6 weeks, or as told by your health care provider or dietitian.  · To follow the eating plan:  1. Eliminate high-FODMAP foods from your diet completely. Choose only low-FODMAP foods to eat. You will do this for 2-6 weeks.  2. Gradually reintroduce high-FODMAP foods into your diet one at a time. Most people should wait a few days before introducing the next new high-FODMAP food into their meal plan. Your dietitian can recommend how quickly you may reintroduce foods.  3. Keep a daily record of what and how much you eat and drink. Make note of any symptoms that you have after eating.  4. Review your daily record with a dietitian regularly to identify which foods you can eat and which foods you should avoid.  General tips  · Drink enough fluid each day to keep your urine pale yellow.  · Avoid processed foods. These often have added sugar  "and may be high in FODMAPs.  · Avoid most dairy products, whole grains, and sweeteners.  · Work with a dietitian to make sure you get enough fiber in your diet.  · Avoid high FODMAP foods at meals to manage symptoms.  Recommended foods  Fruits  Bananas, oranges, tangerines, flako, limes, blueberries, raspberries, strawberries, grapes, cantaloupe, honeydew melon, kiwi, papaya, passion fruit, and pineapple. Limited amounts of dried cranberries, banana chips, and shredded coconut.  Vegetables  Eggplant, zucchini, cucumber, peppers, green beans, bean sprouts, lettuce, arugula, kale, Swiss chard, spinach, antoinette greens, bok malina, summer squash, potato, and tomato. Limited amounts of corn, carrot, and sweet potato. Green parts of scallions.  Grains  Gluten-free grains, such as rice, oats, buckwheat, quinoa, corn, polenta, and millet. Gluten-free pasta, bread, or cereal. Rice noodles. Corn tortillas.  Meats and other proteins  Unseasoned beef, pork, poultry, or fish. Eggs. Sharpe. Tofu (firm) and tempeh. Limited amounts of nuts and seeds, such as almonds, walnuts, brazil nuts, pecans, peanuts, nut butters, pumpkin seeds, daily seeds, and sunflower seeds.  Dairy  Lactose-free milk, yogurt, and kefir. Lactose-free cottage cheese and ice cream. Non-dairy milks, such as almond, coconut, hemp, and rice milk. Non-dairy yogurt. Limited amounts of goat cheese, brie, mozzarella, parmesan, swiss, and other hard cheeses.  Fats and oils  Butter-free spreads. Vegetable oils, such as olive, canola, and sunflower oil.  Seasoning and other foods  Artificial sweeteners with names that do not end in \"ol,\" such as aspartame, saccharine, and stevia. Maple syrup, white table sugar, raw sugar, brown sugar, and molasses. Mayonnaise, soy sauce, and tamari. Fresh basil, coriander, parsley, rosemary, and thyme.  Beverages  Water and mineral water. Sugar-sweetened soft drinks. Small amounts of orange juice or cranberry juice. Black and green tea. " Most dry verna. Coffee.  The items listed above may not be a complete list of foods and beverages you can eat. Contact a dietitian for more information.  Foods to avoid  Fruits  Fresh, dried, and juiced forms of apple, pear, watermelon, peach, plum, cherries, apricots, blackberries, boysenberries, figs, nectarines, and abhishek. Avocado.  Vegetables  Chicory root, artichoke, asparagus, cabbage, snow peas, [place]y sprouts, broccoli, sugar snap peas, mushrooms, celery, and cauliflower. Onions, garlic, leeks, and the white part of scallions.  Grains  Wheat, including kamut, durum, and semolina. Barley and bulgur. Couscous. Wheat-based cereals. Wheat noodles, bread, crackers, and pastries.  Meats and other proteins  Fried or fatty meat. Sausage. Cashews and pistachios. Soybeans, baked beans, black beans, chickpeas, kidney beans, luna beans, navy beans, lentils, black-eyed peas, and split peas.  Dairy  Milk, yogurt, ice cream, and soft cheese. Cream and sour cream. Milk-based sauces. Custard. Buttermilk. Soy milk.  Seasoning and other foods  Any sugar-free gum or candy. Foods that contain artificial sweeteners such as sorbitol, mannitol, isomalt, or xylitol. Foods that contain honey, high-fructose corn syrup, or agave. Bouillon, vegetable stock, beef stock, and chicken stock. Garlic and onion powder. Condiments made with onion, such as hummus, chutney, pickles, relish, salad dressing, and salsa. Tomato paste.  Beverages  Chicory-based drinks. Coffee substitutes. Chamomile tea. Fennel tea. Sweet or fortified verna such as port or amari. Diet soft drinks made with isomalt, mannitol, maltitol, sorbitol, or xylitol. Apple, pear, and abhishek juice. Juices with high-fructose corn syrup.  The items listed above may not be a complete list of foods and beverages you should avoid. Contact a dietitian for more information.  Summary  · FODMAP stands for fermentable oligosaccharides, disaccharides, monosaccharides, and polyols. These  are sugars that are hard for some people to digest.  · A low-FODMAP eating plan is a short-term diet that helps to ease symptoms of certain bowel diseases.  · The eating plan usually lasts up to 6 weeks. After that, high-FODMAP foods are reintroduced gradually and one at a time. This can help you find out which foods may be causing symptoms.  · A low-FODMAP eating plan can be complicated. It is best to work with a dietitian who has experience with this type of plan.  This information is not intended to replace advice given to you by your health care provider. Make sure you discuss any questions you have with your health care provider.  Document Revised: 05/06/2021 Document Reviewed: 05/06/2021  Elsevier Patient Education © 2021 [employer]

## 2021-11-10 NOTE — PROGRESS NOTES
New Patient Consult      Date: 11/10/2021   Patient Name: Araseli Martins  MRN: [id number]  : 2000     Primary Care Provider: Karlene Monterroso APRN  Referring Provider: Loc    Chief Complaint   Patient presents with   • Diarrhea   • Elevated bilirubin     History of Present Illness: Araseli Martins is a 21 y.o. female who is here today as a consultation with Gastroenterology for evaluation of Diarrhea and Elevated bilirubin.    She has nausea after eating, then abdominal cramping then diarrhea. This occurs about 50% of the time with fecal urgency. She has 1-2 BMs per day and typically formed other than with her episodes. She has been having these episodes for about 5 years now, remained the same. Sometimes does have bright red blood on the tissue after a bowel movement, has had an anal fissure in the past diagnosed by her PCP. No current rectal pain. She has tried dicyclomine for relief of abdominal pain, has not seemed to help yet. Has had elevated bilirubin on her labs for years now. No prior EGD or colonoscopy. There is no known family history of colon cancer or colon polyps. Had recent labs with PCP recently which were all normal except for elevated bilirubin. No tobacco or marijuana use. Drinks alcohol socially in small amounts, no daily use. Had imaging of her liver in 2018 which was normal, no abdominal imaging since then.     Subjective      Past Medical History:   Diagnosis Date   • Anemia    • Gallstones    • Iron deficiency    • Snores      Past Surgical History:   Procedure Laterality Date   • CHOLECYSTECTOMY      at age 7, + stones     Family History   Problem Relation Age of Onset   • Migraines Mother    • Osteoporosis Maternal Grandmother    • Stroke Maternal Grandmother    • Diabetes Maternal Grandfather    • Heart attack Maternal Grandfather    • Hypertension Maternal Grandfather    • Stroke Paternal Grandmother    • Diabetes Cousin    • Hypertension Father    • Colon cancer Neg Hx       Social History     Socioeconomic History   • Marital status: Single   Tobacco Use   • Smoking status: Never Smoker   • Smokeless tobacco: Never Used   Vaping Use   • Vaping Use: Never used   Substance and Sexual Activity   • Alcohol use: Yes     Comment: social   • Drug use: Never   • Sexual activity: Defer     Current Outpatient Medications:   •  dicyclomine (Bentyl) 10 MG capsule, Take 1 capsule by mouth 4 (Four) Times a Day Before Meals & at Bedtime., Disp: 90 capsule, Rfl: 1  •  TRI-ESTARYLLA 0.18/0.215/0.25 MG-35 MCG per tablet, TAKE 1 TABLET BY MOUTH EVERY DAY, Disp: , Rfl: 5    Allergies   Allergen Reactions   • Penicillins Rash     The following portions of the patient's history were reviewed and updated as appropriate: allergies, current medications, past family history, past medical history, past social history, past surgical history and problem list.    Objective     Physical Exam  Vitals reviewed.   Constitutional:       General: She is not in acute distress.     Appearance: Normal appearance. She is well-developed. She is not ill-appearing or diaphoretic.   HENT:      Head: Normocephalic and atraumatic.      Right Ear: External ear normal.      Left Ear: External ear normal.      Nose: Nose normal.      Mouth/Throat:      Comments: Wearing a mask  Eyes:      General: No scleral icterus.        Right eye: No discharge.         Left eye: No discharge.      Conjunctiva/sclera: Conjunctivae normal.   Neck:      Vascular: No JVD.   Cardiovascular:      Rate and Rhythm: Normal rate and regular rhythm.      Heart sounds: Normal heart sounds. No murmur heard.  No friction rub. No gallop.    Pulmonary:      Effort: Pulmonary effort is normal. No respiratory distress.      Breath sounds: Normal breath sounds. No wheezing or rales.   Chest:      Chest wall: No tenderness.   Abdominal:      General: Bowel sounds are normal. There is no distension.      Palpations: Abdomen is soft. There is no mass.       "Tenderness: There is no abdominal tenderness. There is no guarding.   Musculoskeletal:         General: No deformity. Normal range of motion.      Cervical back: Normal range of motion.   Skin:     General: Skin is warm and dry.      Findings: No erythema or rash.   Neurological:      Mental Status: She is alert and oriented to person, place, and time.      Coordination: Coordination normal.   Psychiatric:         Mood and Affect: Mood normal.         Behavior: Behavior normal.         Thought Content: Thought content normal.         Judgment: Judgment normal.         Vitals:    11/10/21 1113   BP: 119/73   Pulse: 74   Resp: 12   Temp: 99.1 °F (37.3 °C)   TempSrc: Infrared   Weight: 77.6 kg (171 lb)   Height: 175.3 cm (69\")     Results Review:   I have reviewed the patient's new clinical and imaging results.    Office Visit on 10/04/2021   Component Date Value Ref Range Status   • Glucose 10/15/2021 87  65 - 99 mg/dL Final   • BUN 10/15/2021 8  6 - 20 mg/dL Final   • Creatinine 10/15/2021 0.72  0.57 - 1.00 mg/dL Final   • eGFR Non  Am 10/15/2021 119  >59 mL/min/1.73 Final   • eGFR African Am 10/15/2021 137  >59 mL/min/1.73 Final    Comment: **In accordance with recommendations from the NKF-ASN Task force,**    [facility] is in the process of updating its eGFR calculation to the    2021 CKD-EPI creatinine equation that estimates kidney function    without a race variable.     • BUN/Creatinine Ratio 10/15/2021 11  9 - 23 Final   • Sodium 10/15/2021 139  134 - 144 mmol/L Final   • Potassium 10/15/2021 4.4  3.5 - 5.2 mmol/L Final   • Chloride 10/15/2021 102  96 - 106 mmol/L Final   • Total CO2 10/15/2021 22  20 - 29 mmol/L Final   • Calcium 10/15/2021 10.0  8.7 - 10.2 mg/dL Final   • Total Protein 10/15/2021 7.7  6.0 - 8.5 g/dL Final   • Albumin 10/15/2021 4.9  3.9 - 5.0 g/dL Final   • Globulin 10/15/2021 2.8  1.5 - 4.5 g/dL Final   • A/G Ratio 10/15/2021 1.8  1.2 - 2.2 Final   • Total Bilirubin 10/15/2021 2.1* 0.0 " - 1.2 mg/dL Final   • Alkaline Phosphatase 10/15/2021 60  44 - 121 IU/L Final                  **Please note reference interval change**   • AST (SGOT) 10/15/2021 13  0 - 40 IU/L Final   • ALT (SGPT) 10/15/2021 15  0 - 32 IU/L Final   • Total Cholesterol 10/15/2021 192  100 - 199 mg/dL Final   • Triglycerides 10/15/2021 115  0 - 149 mg/dL Final   • HDL Cholesterol 10/15/2021 68  >39 mg/dL Final   • VLDL Cholesterol Curt 10/15/2021 20  5 - 40 mg/dL Final   • LDL Chol Calc (NIH) 10/15/2021 104* 0 - 99 mg/dL Final   • Hemoglobin A1C 10/15/2021 4.9  4.8 - 5.6 % Final    Comment:          Prediabetes: 5.7 - 6.4           Diabetes: >6.4           Glycemic control for adults with diabetes: <7.0     • TSH 10/15/2021 3.470  0.450 - 4.500 uIU/mL Final   • Free T4 10/15/2021 1.28  0.82 - 1.77 ng/dL Final   • Vitamin B-12 10/15/2021 725  232 - 1,245 pg/mL Final   • 25 Hydroxy, Vitamin D 10/15/2021 41.9  30.0 - 100.0 ng/mL Final   • WBC 10/15/2021 5.8  3.4 - 10.8 x10E3/uL Final   • RBC 10/15/2021 3.82  3.77 - 5.28 x10E6/uL Final   • Hemoglobin 10/15/2021 12.0  11.1 - 15.9 g/dL Final   • Hematocrit 10/15/2021 36.2  34.0 - 46.6 % Final   • MCV 10/15/2021 95  79 - 97 fL Final   • MCH 10/15/2021 31.4  26.6 - 33.0 pg Final   • MCHC 10/15/2021 33.1  31.5 - 35.7 g/dL Final   • RDW 10/15/2021 11.7  11.7 - 15.4 % Final   • Platelets 10/15/2021 265  150 - 450 x10E3/uL Final   • Neutrophil Rel % 10/15/2021 56  Not Estab. % Final   • Lymphocyte Rel % 10/15/2021 35  Not Estab. % Final   • Monocyte Rel % 10/15/2021 7  Not Estab. % Final   • Eosinophil Rel % 10/15/2021 1  Not Estab. % Final   • Basophil Rel % 10/15/2021 1  Not Estab. % Final   • Neutrophils Absolute 10/15/2021 3.2  1.4 - 7.0 x10E3/uL Final   • Lymphocytes Absolute 10/15/2021 2.0  0.7 - 3.1 x10E3/uL Final   • Monocytes Absolute 10/15/2021 0.4  0.1 - 0.9 x10E3/uL Final   • Eosinophils Absolute 10/15/2021 0.1  0.0 - 0.4 x10E3/uL Final   • Basophils Absolute 10/15/2021 0.0  0.0 -  0.2 x10E3/uL Final   • Immature Granulocyte Rel % 10/15/2021 0  Not Estab. % Final   • Immature Grans Absolute 10/15/2021 0.0  0.0 - 0.1 x10E3/uL Final   • Endomysial IgA 10/15/2021 Negative  Negative Final   • Tissue Transglutaminase IgA 10/15/2021 <2  0 - 3 U/mL Final    Comment:                               Negative        0 -  3                                Weak Positive   4 - 10                                Positive           >10   Tissue Transglutaminase (tTG) has been identified   as the endomysial antigen.  Studies have demonstr-   ated that endomysial IgA antibodies have over 99%   specificity for gluten sensitive enteropathy.     • IgA 10/15/2021 159  87 - 352 mg/dL Final   • Saccharomyces cerevisiae Ab IgG 10/15/2021 <20.0  0.0 - 24.9 Units Final    Comment:                                 Negative         <20.0                                  Equivocal  20.1 - 24.9                                  Positive     >or= 25.0     • Saccharomyces cerevisiae Ab IgA 10/15/2021 <20.0  0.0 - 24.9 Units Final    Comment:                                  Negative        <20.0                                   Equivocal 20.1 - 24.9                                   Positive    >or= 25.0  IgA and IgG antibody testing for S. cerevisiae is  useful adjunct testing for differentiating Crohn's  disease and ulcerative colitis. Close to 80% of  Crohn's disease patients are positive for either  IgA or IgG. In ulcerative colitis, less than 15% are  positive for IgG and less than 2% are positive for  IgA. Fewer than 5% are positive for either IgG or  IgA antibody, and no healthy controls had antibody  for both.     • Atypical pANCA 10/15/2021 <1:20  Neg:<1:20 titer Final    Comment: The atypical pANCA pattern has been observed in a significant  percentage of patients with ulcerative colitis, primary sclerosing  cholangitis and autoimmune hepatitis.            ASCA+/PANCA- Suggestive of Crohn's disease             ASCA-/PANCA+ Suggestive of Ulcerative colitis     • Hep C Virus Ab 10/15/2021 <0.1  0.0 - 0.9 s/co ratio Final    Comment:                                   Negative:     < 0.8                               Indeterminate: 0.8 - 0.9                                    Positive:     > 0.9   The CDC recommends that a positive HCV antibody result   be followed up with a HCV Nucleic Acid Amplification   test (313670).        4/2018 US liver:  FINDINGS: The pancreas is normal. The liver reveals no focal abnormality  or biliary ductal dilatation. The gallbladder is absent. The common bile  duct measures 4 mm. The right kidney measures 10.8 cm in length without  evidence of mass, stone or obstruction.  IMPRESSION:  Normal examination. The gallbladder is surgically absent,  but the pancreas, liver, biliary ductal system and right kidney are  ultrasonographically normal.     Assessment / Plan      1. Nausea  2. Abdominal cramping  3. Diarrhea, unspecified type  11/10/2021  Her history is most consistent with irritable bowel syndrome. She has had nausea, abdominal cramping and diarrhea for the past 5 years. Labs completed recently show normal TSH, negative celiac testing, normal CBC and CMP (other than bilirubin). Has not tried any dietary changes. I have recommended starting the low FODMAP diet and daily probiotic for now. She will increase daily water intake. Will consider endoscopic procedures at next visit if her complaints continue.   - Probiotic Product (Envive) capsule; Take 1 capsule by mouth Daily.  Dispense: 10 capsule; Refill: 0    4. Elevated bilirubin  11/10/2021  Her isolated elevated bilirubin appears most consistent with Gilbert Syndrome. Will have lab today to determine direct/indirect bilirubin. Previous liver imaging in 2018 normal. Alk phos, AST and ALT have been normal. CBC normal. No jaundice.   - Bilirubin, Total & Direct; Future        Follow Up:   Return in about 6 weeks (around 12/22/2021) for recheck  IBS.      Nicole Terry PA-C  [facility]  11/10/2021  16:59 EST    Please note that portions of this note may have been completed with a voice recognition program. Efforts were made to edit the dictations, but occasionally words are mistranscribed.

## 2021-11-19 ENCOUNTER — LAB (OUTPATIENT)
Dept: LAB | Facility: HOSPITAL | Age: 21
End: 2021-11-19

## 2021-11-19 DIAGNOSIS — R17 ELEVATED BILIRUBIN: ICD-10-CM

## 2021-11-19 LAB
BILIRUB CONJ SERPL-MCNC: 0.4 MG/DL (ref 0–0.3)
BILIRUB INDIRECT SERPL-MCNC: 1.4 MG/DL
BILIRUB SERPL-MCNC: 1.8 MG/DL (ref 0–1.2)

## 2021-11-19 PROCEDURE — 82248 BILIRUBIN DIRECT: CPT

## 2021-11-19 PROCEDURE — 36415 COLL VENOUS BLD VENIPUNCTURE: CPT

## 2021-11-19 PROCEDURE — 82247 BILIRUBIN TOTAL: CPT

## 2021-11-23 DIAGNOSIS — R19.7 DIARRHEA, UNSPECIFIED TYPE: ICD-10-CM

## 2021-11-23 RX ORDER — DICYCLOMINE HYDROCHLORIDE 10 MG/1
10 CAPSULE ORAL
Qty: 270 CAPSULE | Refills: 1 | Status: SHIPPED | OUTPATIENT
Start: 2021-11-23 | End: 2022-08-22 | Stop reason: SDUPTHER

## 2021-11-23 NOTE — TELEPHONE ENCOUNTER
Rx Refill Note  Requested Prescriptions     Pending Prescriptions Disp Refills   • dicyclomine (BENTYL) 10 MG capsule [Pharmacy Med Name: DICYCLOMINE 10 MG CAPSULE] 90 capsule 1     Sig: TAKE 1 CAPSULE BY MOUTH 4 (FOUR) TIMES A DAY BEFORE MEALS & AT BEDTIME.      Last office visit with prescribing clinician: 10/4/2021      Next office visit with prescribing clinician: Visit date not found            Darlyn Ferrer LPN  11/23/21, 09:01 EST

## 2021-12-13 ENCOUNTER — OFFICE VISIT (OUTPATIENT)
Dept: GASTROENTEROLOGY | Facility: CLINIC | Age: 21
End: 2021-12-13

## 2021-12-13 VITALS
HEIGHT: 69 IN | BODY MASS INDEX: 25.92 KG/M2 | TEMPERATURE: 98.4 F | SYSTOLIC BLOOD PRESSURE: 132 MMHG | DIASTOLIC BLOOD PRESSURE: 80 MMHG | RESPIRATION RATE: 16 BRPM | WEIGHT: 175 LBS | HEART RATE: 87 BPM

## 2021-12-13 DIAGNOSIS — R17 ELEVATED BILIRUBIN: ICD-10-CM

## 2021-12-13 DIAGNOSIS — K58.0 IRRITABLE BOWEL SYNDROME WITH DIARRHEA: ICD-10-CM

## 2021-12-13 DIAGNOSIS — R19.7 DIARRHEA, UNSPECIFIED TYPE: ICD-10-CM

## 2021-12-13 DIAGNOSIS — R11.0 NAUSEA: Primary | ICD-10-CM

## 2021-12-13 DIAGNOSIS — R10.9 ABDOMINAL CRAMPING: ICD-10-CM

## 2021-12-13 DIAGNOSIS — E80.4 GILBERT SYNDROME: ICD-10-CM

## 2021-12-13 PROCEDURE — 99214 OFFICE O/P EST MOD 30 MIN: CPT | Performed by: NURSE PRACTITIONER

## 2021-12-13 NOTE — PATIENT INSTRUCTIONS
1. Low FODMAP diet - avoid all dairy.   2. Bentyl 10 mg 1 po 4 times per day as needed for abdominal pain and diarrhea.   3. May take Imodium 2 mg 1/2-1 caplet 1-2 times per day as needed for diarrhea.   4. Stool studies  5. Follow up: 3 months or sooner if needed    Low-FODMAP Eating Plan    FODMAP stands for fermentable oligosaccharides, disaccharides, monosaccharides, and polyols. These are sugars that are hard for some people to digest. A low-FODMAP eating plan may help some people who have irritable bowel syndrome (IBS) and certain other bowel (intestinal) diseases to manage their symptoms.  This meal plan can be complicated to follow. Work with a diet and nutrition specialist (dietitian) to make a low-FODMAP eating plan that is right for you. A dietitian can help make sure that you get enough nutrition from this diet.  What are tips for following this plan?  Reading food labels  · Check labels for hidden FODMAPs such as:  ? High-fructose syrup.  ? Honey.  ? Agave.  ? Natural fruit flavors.  ? Onion or garlic powder.  · Choose low-FODMAP foods that contain 3-4 grams of fiber per serving.  · Check food labels for serving sizes. Eat only one serving at a time to make sure FODMAP levels stay low.  Shopping  · Shop with a list of foods that are recommended on this diet and make a meal plan.  Meal planning  · Follow a low-FODMAP eating plan for up to 6 weeks, or as told by your health care provider or dietitian.  · To follow the eating plan:  1. Eliminate high-FODMAP foods from your diet completely. Choose only low-FODMAP foods to eat. You will do this for 2-6 weeks.  2. Gradually reintroduce high-FODMAP foods into your diet one at a time. Most people should wait a few days before introducing the next new high-FODMAP food into their meal plan. Your dietitian can recommend how quickly you may reintroduce foods.  3. Keep a daily record of what and how much you eat and drink. Make note of any symptoms that you have  "after eating.  4. Review your daily record with a dietitian regularly to identify which foods you can eat and which foods you should avoid.  General tips  · Drink enough fluid each day to keep your urine pale yellow.  · Avoid processed foods. These often have added sugar and may be high in FODMAPs.  · Avoid most dairy products, whole grains, and sweeteners.  · Work with a dietitian to make sure you get enough fiber in your diet.  · Avoid high FODMAP foods at meals to manage symptoms.  Recommended foods  Fruits  Bananas, oranges, tangerines, flako, limes, blueberries, raspberries, strawberries, grapes, cantaloupe, honeydew melon, kiwi, papaya, passion fruit, and pineapple. Limited amounts of dried cranberries, banana chips, and shredded coconut.  Vegetables  Eggplant, zucchini, cucumber, peppers, green beans, bean sprouts, lettuce, arugula, kale, Swiss chard, spinach, antoinette greens, bok malina, summer squash, potato, and tomato. Limited amounts of corn, carrot, and sweet potato. Green parts of scallions.  Grains  Gluten-free grains, such as rice, oats, buckwheat, quinoa, corn, polenta, and millet. Gluten-free pasta, bread, or cereal. Rice noodles. Corn tortillas.  Meats and other proteins  Unseasoned beef, pork, poultry, or fish. Eggs. Sharpe. Tofu (firm) and tempeh. Limited amounts of nuts and seeds, such as almonds, walnuts, brazil nuts, pecans, peanuts, nut butters, pumpkin seeds, daily seeds, and sunflower seeds.  Dairy  Lactose-free milk, yogurt, and kefir. Lactose-free cottage cheese and ice cream. Non-dairy milks, such as almond, coconut, hemp, and rice milk. Non-dairy yogurt. Limited amounts of goat cheese, brie, mozzarella, parmesan, swiss, and other hard cheeses.  Fats and oils  Butter-free spreads. Vegetable oils, such as olive, canola, and sunflower oil.  Seasoning and other foods  Artificial sweeteners with names that do not end in \"ol,\" such as aspartame, saccharine, and stevia. Maple syrup, white table " sugar, raw sugar, brown sugar, and molasses. Mayonnaise, soy sauce, and tamari. Fresh basil, coriander, parsley, rosemary, and thyme.  Beverages  Water and mineral water. Sugar-sweetened soft drinks. Small amounts of orange juice or cranberry juice. Black and green tea. Most dry verna. Coffee.  The items listed above may not be a complete list of foods and beverages you can eat. Contact a dietitian for more information.  Foods to avoid  Fruits  Fresh, dried, and juiced forms of apple, pear, watermelon, peach, plum, cherries, apricots, blackberries, boysenberries, figs, nectarines, and abhishek. Avocado.  Vegetables  Chicory root, artichoke, asparagus, cabbage, snow peas, [place] sprouts, broccoli, sugar snap peas, mushrooms, celery, and cauliflower. Onions, garlic, leeks, and the white part of scallions.  Grains  Wheat, including kamut, durum, and semolina. Barley and bulgur. Couscous. Wheat-based cereals. Wheat noodles, bread, crackers, and pastries.  Meats and other proteins  Fried or fatty meat. Sausage. Cashews and pistachios. Soybeans, baked beans, black beans, chickpeas, kidney beans, luna beans, navy beans, lentils, black-eyed peas, and split peas.  Dairy  Milk, yogurt, ice cream, and soft cheese. Cream and sour cream. Milk-based sauces. Custard. Buttermilk. Soy milk.  Seasoning and other foods  Any sugar-free gum or candy. Foods that contain artificial sweeteners such as sorbitol, mannitol, isomalt, or xylitol. Foods that contain honey, high-fructose corn syrup, or agave. Bouillon, vegetable stock, beef stock, and chicken stock. Garlic and onion powder. Condiments made with onion, such as hummus, chutney, pickles, relish, salad dressing, and salsa. Tomato paste.  Beverages  Chicory-based drinks. Coffee substitutes. Chamomile tea. Fennel tea. Sweet or fortified verna such as port or amari. Diet soft drinks made with isomalt, mannitol, maltitol, sorbitol, or xylitol. Apple, pear, and abhishek juice. Juices with  high-fructose corn syrup.  The items listed above may not be a complete list of foods and beverages you should avoid. Contact a dietitian for more information.  Summary  · FODMAP stands for fermentable oligosaccharides, disaccharides, monosaccharides, and polyols. These are sugars that are hard for some people to digest.  · A low-FODMAP eating plan is a short-term diet that helps to ease symptoms of certain bowel diseases.  · The eating plan usually lasts up to 6 weeks. After that, high-FODMAP foods are reintroduced gradually and one at a time. This can help you find out which foods may be causing symptoms.  · A low-FODMAP eating plan can be complicated. It is best to work with a dietitian who has experience with this type of plan.  This information is not intended to replace advice given to you by your health care provider. Make sure you discuss any questions you have with your health care provider.  Document Revised: 05/06/2021 Document Reviewed: 05/06/2021  [employer] Patient Education © 2021 [employer]

## 2021-12-13 NOTE — PROGRESS NOTES
Follow Up Note     Date: 2021   Patient Name: Araseli Martins  MRN: [id number]  : 2000     Primary Care Provider: Karlene Monterroso APRN     Chief Complaint   Patient presents with   • Follow-up     History of present illness:   2021  Araseli Martins is a 21 y.o. female who is here today for follow up for nausea.    She has been feeling somewhat better. She has had about 5 episodes of diarrhea since she has cut out dairy and gluten. She had diarrhea after eating at [employer] and again after eating eggs and evans. No history of rectal bleeding.  No nausea or vomiting. She is taking the Bentyl 4 times per day and it has helped.     Interval History:  11/10/2021  She has nausea after eating, then abdominal cramping then diarrhea. This occurs about 50% of the time with fecal urgency. She has 1-2 BMs per day and typically formed other than with her episodes. She has been having these episodes for about 5 years now, remained the same. Sometimes does have bright red blood on the tissue after a bowel movement, has had an anal fissure in the past diagnosed by her PCP. No current rectal pain. She has tried dicyclomine for relief of abdominal pain, has not seemed to help yet. Has had elevated bilirubin on her labs for years now. No prior EGD or colonoscopy. There is no known family history of colon cancer or colon polyps. Had recent labs with PCP recently which were all normal except for elevated bilirubin. No tobacco or marijuana use. Drinks alcohol socially in small amounts, no daily use. Had imaging of her liver in 2018 which was normal, no abdominal imaging since then.     Subjective      Past Medical History:   Diagnosis Date   • Anemia    • Gallstones    • Iron deficiency    • Snores      Past Surgical History:   Procedure Laterality Date   • CHOLECYSTECTOMY      at age 7, + stones     Family History   Problem Relation Age of Onset   • Migraines Mother    • Osteoporosis Maternal Grandmother     • Stroke Maternal Grandmother    • Diabetes Maternal Grandfather    • Heart attack Maternal Grandfather    • Hypertension Maternal Grandfather    • Stroke Paternal Grandmother    • Diabetes Cousin    • Hypertension Father    • Colon cancer Neg Hx      Social History     Socioeconomic History   • Marital status: Single   Tobacco Use   • Smoking status: Never Smoker   • Smokeless tobacco: Never Used   Vaping Use   • Vaping Use: Never used   Substance and Sexual Activity   • Alcohol use: Yes     Comment: social   • Drug use: Never   • Sexual activity: Defer       Current Outpatient Medications:   •  dicyclomine (BENTYL) 10 MG capsule, TAKE 1 CAPSULE BY MOUTH 4 (FOUR) TIMES A DAY BEFORE MEALS & AT BEDTIME., Disp: 270 capsule, Rfl: 1  •  Probiotic Product (Envive) capsule, Take 1 capsule by mouth Daily., Disp: 10 capsule, Rfl: 0  •  TRI-ESTARYLLA 0.18/0.215/0.25 MG-35 MCG per tablet, TAKE 1 TABLET BY MOUTH EVERY DAY, Disp: , Rfl: 5     Allergies   Allergen Reactions   • Penicillins Rash     The following portions of the patient's history were reviewed and updated as appropriate: allergies, current medications, past family history, past medical history, past social history, past surgical history and problem list.  Objective     Physical Exam  Vitals and nursing note reviewed.   Constitutional:       General: She is not in acute distress.     Appearance: Normal appearance. She is well-developed.   HENT:      Head: Normocephalic and atraumatic.      Right Ear: Hearing normal.      Left Ear: Hearing normal.      Mouth/Throat:      Mouth: Mucous membranes are not pale, not dry and not cyanotic.   Eyes:      General: Lids are normal.      Conjunctiva/sclera: Conjunctivae normal.   Neck:      Trachea: Trachea normal.   Cardiovascular:      Rate and Rhythm: Normal rate and regular rhythm.      Heart sounds: Normal heart sounds.   Pulmonary:      Effort: Pulmonary effort is normal. No respiratory distress.      Breath sounds:  "Normal breath sounds.   Abdominal:      General: Bowel sounds are normal.      Palpations: Abdomen is soft. There is no mass.      Tenderness: There is no abdominal tenderness.      Hernia: No hernia is present.   Skin:     General: Skin is warm and dry.   Neurological:      Mental Status: She is alert and oriented to person, place, and time.   Psychiatric:         Mood and Affect: Mood normal.         Speech: Speech normal.         Behavior: Behavior normal. Behavior is cooperative.       Vitals:    12/13/21 1118   BP: 132/80   Pulse: 87   Resp: 16   Temp: 98.4 °F (36.9 °C)   Weight: 79.4 kg (175 lb)   Height: 175.3 cm (69\")     Body mass index is 25.84 kg/m².     Results Review:   I reviewed the patient's new clinical results.    Lab on 11/19/2021   Component Date Value Ref Range Status   • Total Bilirubin 11/19/2021 1.8* 0.0 - 1.2 mg/dL Final   • Bilirubin, Direct 11/19/2021 0.4* 0.0 - 0.3 mg/dL Final   • Bilirubin, Indirect 11/19/2021 1.4  mg/dL Final   Office Visit on 10/04/2021   Component Date Value Ref Range Status   • Glucose 10/15/2021 87  65 - 99 mg/dL Final   • BUN 10/15/2021 8  6 - 20 mg/dL Final   • Creatinine 10/15/2021 0.72  0.57 - 1.00 mg/dL Final   • eGFR Non  Am 10/15/2021 119  >59 mL/min/1.73 Final   • eGFR African Am 10/15/2021 137  >59 mL/min/1.73 Final    Comment: **In accordance with recommendations from the NKF-ASN Task force,**    [facility] is in the process of updating its eGFR calculation to the    2021 CKD-EPI creatinine equation that estimates kidney function    without a race variable.     • BUN/Creatinine Ratio 10/15/2021 11  9 - 23 Final   • Sodium 10/15/2021 139  134 - 144 mmol/L Final   • Potassium 10/15/2021 4.4  3.5 - 5.2 mmol/L Final   • Chloride 10/15/2021 102  96 - 106 mmol/L Final   • Total CO2 10/15/2021 22  20 - 29 mmol/L Final   • Calcium 10/15/2021 10.0  8.7 - 10.2 mg/dL Final   • Total Protein 10/15/2021 7.7  6.0 - 8.5 g/dL Final   • Albumin 10/15/2021 4.9  3.9 - " 5.0 g/dL Final   • Globulin 10/15/2021 2.8  1.5 - 4.5 g/dL Final   • A/G Ratio 10/15/2021 1.8  1.2 - 2.2 Final   • Total Bilirubin 10/15/2021 2.1* 0.0 - 1.2 mg/dL Final   • Alkaline Phosphatase 10/15/2021 60  44 - 121 IU/L Final                  **Please note reference interval change**   • AST (SGOT) 10/15/2021 13  0 - 40 IU/L Final   • ALT (SGPT) 10/15/2021 15  0 - 32 IU/L Final   • Total Cholesterol 10/15/2021 192  100 - 199 mg/dL Final   • Triglycerides 10/15/2021 115  0 - 149 mg/dL Final   • HDL Cholesterol 10/15/2021 68  >39 mg/dL Final   • VLDL Cholesterol Curt 10/15/2021 20  5 - 40 mg/dL Final   • LDL Chol Calc (NIH) 10/15/2021 104* 0 - 99 mg/dL Final   • Hemoglobin A1C 10/15/2021 4.9  4.8 - 5.6 % Final    Comment:          Prediabetes: 5.7 - 6.4           Diabetes: >6.4           Glycemic control for adults with diabetes: <7.0     • TSH 10/15/2021 3.470  0.450 - 4.500 uIU/mL Final   • Free T4 10/15/2021 1.28  0.82 - 1.77 ng/dL Final   • Vitamin B-12 10/15/2021 725  232 - 1,245 pg/mL Final   • 25 Hydroxy, Vitamin D 10/15/2021 41.9  30.0 - 100.0 ng/mL Final    Comment: Vitamin D deficiency has been defined by the [place] of  Medicine and an Endocrine Society practice guideline as a  level of serum 25-OH vitamin D less than 20 ng/mL (1,2).  The Endocrine Society went on to further define vitamin D  insufficiency as a level between 21 and 29 ng/mL (2).  1. IOM ([place] of [employer]). 2010. Dietary reference     intakes for calcium and D. [place]: The     National Academies Press.  2. Rosana MF, Jany NC, Felice ESCAMILLA, et al.     Evaluation, treatment, and prevention of vitamin D     deficiency: an Endocrine Society clinical practice     guideline. JCEM. 2011 Jul; 96(7):1911-30.     • WBC 10/15/2021 5.8  3.4 - 10.8 x10E3/uL Final   • RBC 10/15/2021 3.82  3.77 - 5.28 x10E6/uL Final   • Hemoglobin 10/15/2021 12.0  11.1 - 15.9 g/dL Final   • Hematocrit 10/15/2021 36.2  34.0 - 46.6 % Final   • MCV  10/15/2021 95  79 - 97 fL Final   • MCH 10/15/2021 31.4  26.6 - 33.0 pg Final   • MCHC 10/15/2021 33.1  31.5 - 35.7 g/dL Final   • RDW 10/15/2021 11.7  11.7 - 15.4 % Final   • Platelets 10/15/2021 265  150 - 450 x10E3/uL Final   • Neutrophil Rel % 10/15/2021 56  Not Estab. % Final   • Lymphocyte Rel % 10/15/2021 35  Not Estab. % Final   • Monocyte Rel % 10/15/2021 7  Not Estab. % Final   • Eosinophil Rel % 10/15/2021 1  Not Estab. % Final   • Basophil Rel % 10/15/2021 1  Not Estab. % Final   • Neutrophils Absolute 10/15/2021 3.2  1.4 - 7.0 x10E3/uL Final   • Lymphocytes Absolute 10/15/2021 2.0  0.7 - 3.1 x10E3/uL Final   • Monocytes Absolute 10/15/2021 0.4  0.1 - 0.9 x10E3/uL Final   • Eosinophils Absolute 10/15/2021 0.1  0.0 - 0.4 x10E3/uL Final   • Basophils Absolute 10/15/2021 0.0  0.0 - 0.2 x10E3/uL Final   • Immature Granulocyte Rel % 10/15/2021 0  Not Estab. % Final   • Immature Grans Absolute 10/15/2021 0.0  0.0 - 0.1 x10E3/uL Final   • Endomysial IgA 10/15/2021 Negative  Negative Final   • Tissue Transglutaminase IgA 10/15/2021 <2  0 - 3 U/mL Final    Comment:                               Negative        0 -  3                                Weak Positive   4 - 10                                Positive           >10   Tissue Transglutaminase (tTG) has been identified   as the endomysial antigen.  Studies have demonstr-   ated that endomysial IgA antibodies have over 99%   specificity for gluten sensitive enteropathy.     • IgA 10/15/2021 159  87 - 352 mg/dL Final   • Saccharomyces cerevisiae Ab IgG 10/15/2021 <20.0  0.0 - 24.9 Units Final    Comment:                                 Negative         <20.0                                  Equivocal  20.1 - 24.9                                  Positive     >or= 25.0     • Saccharomyces cerevisiae Ab IgA 10/15/2021 <20.0  0.0 - 24.9 Units Final    Comment:                                  Negative        <20.0                                   Equivocal 20.1  - 24.9                                   Positive    >or= 25.0  IgA and IgG antibody testing for S. cerevisiae is  useful adjunct testing for differentiating Crohn's  disease and ulcerative colitis. Close to 80% of  Crohn's disease patients are positive for either  IgA or IgG. In ulcerative colitis, less than 15% are  positive for IgG and less than 2% are positive for  IgA. Fewer than 5% are positive for either IgG or  IgA antibody, and no healthy controls had antibody  for both.     • Atypical pANCA 10/15/2021 <1:20  Neg:<1:20 titer Final    Comment: The atypical pANCA pattern has been observed in a significant  percentage of patients with ulcerative colitis, primary sclerosing  cholangitis and autoimmune hepatitis.            ASCA+/PANCA- Suggestive of Crohn's disease            ASCA-/PANCA+ Suggestive of Ulcerative colitis     • Hep C Virus Ab 10/15/2021 <0.1  0.0 - 0.9 s/co ratio Final    Comment:                                   Negative:     < 0.8                               Indeterminate: 0.8 - 0.9                                    Positive:     > 0.9   The CDC recommends that a positive HCV antibody result   be followed up with a HCV Nucleic Acid Amplification   test ([id number]).        4/2018 US liver:  Normal examination. The gallbladder is surgically absent, but the pancreas, liver, biliary ductal system and right kidney are ultrasonographically normal.      Assessment / Plan      1. Nausea  2. Abdominal cramping  3. Diarrhea, unspecified type  4. Irritable bowel syndrome with diarrhea  She has been following low FODMAP diet and has been feeling better.  She is only had 5 episodes of diarrhea since she cut out dairy and gluten.  She noticed that when she had episodes of diarrhea, she had been eating foods cooked with oils or fats.  No history of rectal bleeding.  No nausea improved.  She is taking Bentyl 10 mg 4 times a day and it has helped. Does not need Zofran. Suspect IBS-D.  Low FODMAP diet - avoid  dairy and oils/greasy foods.  Bentyl 10 mg 1 po 4 times per day as needed for lower abdominal cramping and diarrhea.   Stool studies if diarrhea worsens. Consider trial of cholestyramine if stool studies negative and diarrhea is daily.  Imodium 2 mg 1/2-1 caplet 1-2 times per day as needed for diarrhea.   Will consider colonoscopy in the future if symptoms worsen or develops rectal bleeding.     - Clostridium Difficile Toxin, PCR - Stool, Per Rectum; Future  - Calprotectin, Fecal - Stool, Per Rectum; Future  - Pancreatic Elastase, Fecal - Stool, Per Rectum; Future  - Gastrointestinal Panel, PCR - Stool, Per Rectum; Future    11/10/2021  Her history is most consistent with irritable bowel syndrome. She has had nausea, abdominal cramping and diarrhea for the past 5 years. Labs completed recently show normal TSH, negative celiac testing, normal CBC and CMP (other than bilirubin). Has not tried any dietary changes. I have recommended starting the low FODMAP diet and daily probiotic for now. She will increase daily water intake. Will consider endoscopic procedures at next visit if her complaints continue.     5. Elevated bilirubin  6. Gilbert syndrome  Direct bilirubin 0.4, borderline elevated, though same as when checked on 4/19/2018. Most consistent with Gilbert's syndrome. No history of IVDA, alcohol use, tattoos, blood transfusions or family history of liver disease. Further evaluation in the future if direct bilirubin elevated.    11/10/2021  Her isolated elevated bilirubin appears most consistent with Gilbert Syndrome. Will have lab today to determine direct/indirect bilirubin. Previous liver imaging in 2018 normal. Alk phos, AST and ALT have been normal. CBC normal. No jaundice.    Patient Instructions   1. Low FODMAP diet - avoid all dairy.   2. Bentyl 10 mg 1 po 4 times per day as needed for abdominal pain and diarrhea.   3. May take Imodium 2 mg 1/2-1 caplet 1-2 times per day as needed for diarrhea.   4. Stool  studies  5. Follow up: 3 months or sooner if needed    Low-FODMAP Eating Plan    FODMAP stands for fermentable oligosaccharides, disaccharides, monosaccharides, and polyols. These are sugars that are hard for some people to digest. A low-FODMAP eating plan may help some people who have irritable bowel syndrome (IBS) and certain other bowel (intestinal) diseases to manage their symptoms.  This meal plan can be complicated to follow. Work with a diet and nutrition specialist (dietitian) to make a low-FODMAP eating plan that is right for you. A dietitian can help make sure that you get enough nutrition from this diet.  What are tips for following this plan?  Reading food labels  · Check labels for hidden FODMAPs such as:  ? High-fructose syrup.  ? Honey.  ? Agave.  ? Natural fruit flavors.  ? Onion or garlic powder.  · Choose low-FODMAP foods that contain 3-4 grams of fiber per serving.  · Check food labels for serving sizes. Eat only one serving at a time to make sure FODMAP levels stay low.  Shopping  · Shop with a list of foods that are recommended on this diet and make a meal plan.  Meal planning  · Follow a low-FODMAP eating plan for up to 6 weeks, or as told by your health care provider or dietitian.  · To follow the eating plan:  1. Eliminate high-FODMAP foods from your diet completely. Choose only low-FODMAP foods to eat. You will do this for 2-6 weeks.  2. Gradually reintroduce high-FODMAP foods into your diet one at a time. Most people should wait a few days before introducing the next new high-FODMAP food into their meal plan. Your dietitian can recommend how quickly you may reintroduce foods.  3. Keep a daily record of what and how much you eat and drink. Make note of any symptoms that you have after eating.  4. Review your daily record with a dietitian regularly to identify which foods you can eat and which foods you should avoid.  General tips  · Drink enough fluid each day to keep your urine pale  "yellow.  · Avoid processed foods. These often have added sugar and may be high in FODMAPs.  · Avoid most dairy products, whole grains, and sweeteners.  · Work with a dietitian to make sure you get enough fiber in your diet.  · Avoid high FODMAP foods at meals to manage symptoms.  Recommended foods  Fruits  Bananas, oranges, tangerines, flako, limes, blueberries, raspberries, strawberries, grapes, cantaloupe, honeydew melon, kiwi, papaya, passion fruit, and pineapple. Limited amounts of dried cranberries, banana chips, and shredded coconut.  Vegetables  Eggplant, zucchini, cucumber, peppers, green beans, bean sprouts, lettuce, arugula, kale, Swiss chard, spinach, antoinette greens, bok malina, summer squash, potato, and tomato. Limited amounts of corn, carrot, and sweet potato. Green parts of scallions.  Grains  Gluten-free grains, such as rice, oats, buckwheat, quinoa, corn, polenta, and millet. Gluten-free pasta, bread, or cereal. Rice noodles. Corn tortillas.  Meats and other proteins  Unseasoned beef, pork, poultry, or fish. Eggs. Sharpe. Tofu (firm) and tempeh. Limited amounts of nuts and seeds, such as almonds, walnuts, brazil nuts, pecans, peanuts, nut butters, pumpkin seeds, daily seeds, and sunflower seeds.  Dairy  Lactose-free milk, yogurt, and kefir. Lactose-free cottage cheese and ice cream. Non-dairy milks, such as almond, coconut, hemp, and rice milk. Non-dairy yogurt. Limited amounts of goat cheese, brie, mozzarella, parmesan, swiss, and other hard cheeses.  Fats and oils  Butter-free spreads. Vegetable oils, such as olive, canola, and sunflower oil.  Seasoning and other foods  Artificial sweeteners with names that do not end in \"ol,\" such as aspartame, saccharine, and stevia. Maple syrup, white table sugar, raw sugar, brown sugar, and molasses. Mayonnaise, soy sauce, and tamari. Fresh basil, coriander, parsley, rosemary, and thyme.  Beverages  Water and mineral water. Sugar-sweetened soft drinks. Small " amounts of orange juice or cranberry juice. Black and green tea. Most dry verna. Coffee.  The items listed above may not be a complete list of foods and beverages you can eat. Contact a dietitian for more information.  Foods to avoid  Fruits  Fresh, dried, and juiced forms of apple, pear, watermelon, peach, plum, cherries, apricots, blackberries, boysenberries, figs, nectarines, and abhishek. Avocado.  Vegetables  Chicory root, artichoke, asparagus, cabbage, snow peas, [place] sprouts, broccoli, sugar snap peas, mushrooms, celery, and cauliflower. Onions, garlic, leeks, and the white part of scallions.  Grains  Wheat, including kamut, durum, and semolina. Barley and bulgur. Couscous. Wheat-based cereals. Wheat noodles, bread, crackers, and pastries.  Meats and other proteins  Fried or fatty meat. Sausage. Cashews and pistachios. Soybeans, baked beans, black beans, chickpeas, kidney beans, luna beans, navy beans, lentils, black-eyed peas, and split peas.  Dairy  Milk, yogurt, ice cream, and soft cheese. Cream and sour cream. Milk-based sauces. Custard. Buttermilk. Soy milk.  Seasoning and other foods  Any sugar-free gum or candy. Foods that contain artificial sweeteners such as sorbitol, mannitol, isomalt, or xylitol. Foods that contain honey, high-fructose corn syrup, or agave. Bouillon, vegetable stock, beef stock, and chicken stock. Garlic and onion powder. Condiments made with onion, such as hummus, chutney, pickles, relish, salad dressing, and salsa. Tomato paste.  Beverages  Chicory-based drinks. Coffee substitutes. Chamomile tea. Fennel tea. Sweet or fortified verna such as port or amari. Diet soft drinks made with isomalt, mannitol, maltitol, sorbitol, or xylitol. Apple, pear, and abhishek juice. Juices with high-fructose corn syrup.  The items listed above may not be a complete list of foods and beverages you should avoid. Contact a dietitian for more information.  Summary  · FODMAP stands for fermentable  oligosaccharides, disaccharides, monosaccharides, and polyols. These are sugars that are hard for some people to digest.  · A low-FODMAP eating plan is a short-term diet that helps to ease symptoms of certain bowel diseases.  · The eating plan usually lasts up to 6 weeks. After that, high-FODMAP foods are reintroduced gradually and one at a time. This can help you find out which foods may be causing symptoms.  · A low-FODMAP eating plan can be complicated. It is best to work with a dietitian who has experience with this type of plan.  This information is not intended to replace advice given to you by your health care provider. Make sure you discuss any questions you have with your health care provider.  Document Revised: 05/06/2021 Document Reviewed: 05/06/2021  [email or web address] Patient Education © 2021 [employer]    Nikolai Hopkins, APRN  12/13/2021    Please note that portions of this note may have been completed with a voice recognition program. Efforts were made to edit the dictations, but occasionally words are mistranscribed.

## 2022-02-09 ENCOUNTER — OFFICE VISIT (OUTPATIENT)
Dept: INTERNAL MEDICINE | Facility: CLINIC | Age: 22
End: 2022-02-09

## 2022-02-09 VITALS
RESPIRATION RATE: 16 BRPM | WEIGHT: 174 LBS | HEART RATE: 74 BPM | OXYGEN SATURATION: 100 % | SYSTOLIC BLOOD PRESSURE: 104 MMHG | BODY MASS INDEX: 25.77 KG/M2 | DIASTOLIC BLOOD PRESSURE: 79 MMHG | TEMPERATURE: 98.4 F | HEIGHT: 69 IN

## 2022-02-09 DIAGNOSIS — J30.9 ALLERGIC RHINITIS, UNSPECIFIED SEASONALITY, UNSPECIFIED TRIGGER: ICD-10-CM

## 2022-02-09 DIAGNOSIS — G43.009 MIGRAINE WITHOUT AURA AND WITHOUT STATUS MIGRAINOSUS, NOT INTRACTABLE: Primary | ICD-10-CM

## 2022-02-09 PROCEDURE — 99214 OFFICE O/P EST MOD 30 MIN: CPT | Performed by: NURSE PRACTITIONER

## 2022-02-09 RX ORDER — FLUTICASONE PROPIONATE 50 MCG
2 SPRAY, SUSPENSION (ML) NASAL DAILY
Qty: 15.8 ML | Refills: 1 | Status: SHIPPED | OUTPATIENT
Start: 2022-02-09 | End: 2022-02-21 | Stop reason: SDUPTHER

## 2022-02-09 RX ORDER — BACLOFEN 20 MG
1 TABLET ORAL DAILY
Qty: 30 TABLET | Refills: 3 | Status: SHIPPED | OUTPATIENT
Start: 2022-02-09 | End: 2022-03-02

## 2022-02-09 NOTE — PROGRESS NOTES
Chief Complaint / Reason:      Chief Complaint   Patient presents with   • Migraine     3 really bad ones in the past month and now a constant HA       Subjective     HPI  Patient presents today with complaints of migraines and states that she has had 3 really bad ones in the past month and now seems to have constant headache but has been under a lot of stress with school.  She stated that she had to email her professor on Sunday because she was not able to finish an assignment because looking at the computer her her eyes worse and she is not up-to-date on vision or dental.  She denies any blurred vision numbness or tingling.  Patient is on birth control and denies any history of head injury or trauma that she is aware of.  She stated that she does have seasonal allergies and sometimes feels like her ears are a little full but denies any dizziness.  Does not take allergy medicine on a regular basis.  States that she has had some throbbing behind her eyes and in the frontal area.  Denies any nausea or vomiting.  She denies any chance of pregnancy and states last her menstrual period was 2 weeks ago.  Patient does drink caffeine regularly and states she does not sleep the best.  History taken from: patient    PMH/FH/Social History were reviewed and updated appropriately in the electronic medical record.   Past Medical History:   Diagnosis Date   • Anemia    • Gallstones    • Iron deficiency    • Snores      Past Surgical History:   Procedure Laterality Date   • CHOLECYSTECTOMY      at age 7, + stones     Social History     Socioeconomic History   • Marital status: Single   Tobacco Use   • Smoking status: Never Smoker   • Smokeless tobacco: Never Used   Vaping Use   • Vaping Use: Never used   Substance and Sexual Activity   • Alcohol use: Yes     Comment: social   • Drug use: Never   • Sexual activity: Defer     Family History   Problem Relation Age of Onset   • Migraines Mother    • Osteoporosis Maternal Grandmother     • Stroke Maternal Grandmother    • Diabetes Maternal Grandfather    • Heart attack Maternal Grandfather    • Hypertension Maternal Grandfather    • Stroke Paternal Grandmother    • Diabetes Cousin    • Hypertension Father    • Colon cancer Neg Hx        Review of Systems:   Review of Systems   Constitutional: Positive for fatigue.   HENT: Positive for postnasal drip. Negative for ear pain (ear pressure).    Respiratory: Negative.    Cardiovascular: Negative.    Allergic/Immunologic: Positive for environmental allergies.   Neurological: Positive for headache.   Psychiatric/Behavioral: Positive for sleep disturbance and stress.         All other systems were reviewed and are negative.  Exceptions are noted in the subjective or above.      Objective     Vital Signs  Vitals:    02/09/22 1054   BP: 104/79   Pulse: 74   Resp: 16   Temp: 98.4 °F (36.9 °C)   SpO2: 100%       Body mass index is 25.7 kg/m².    Physical Exam  Vitals and nursing note reviewed.   Constitutional:       General: She is not in acute distress.     Appearance: She is well-developed.   HENT:      Head: Normocephalic and atraumatic.      Right Ear: Ear canal and external ear normal. Tympanic membrane is erythematous and bulging.      Left Ear: Ear canal and external ear normal. Tympanic membrane is erythematous and bulging.      Nose: Mucosal edema present. No rhinorrhea.      Right Sinus: No maxillary sinus tenderness or frontal sinus tenderness.      Left Sinus: No maxillary sinus tenderness or frontal sinus tenderness.      Mouth/Throat:      Mouth: Mucous membranes are dry.      Pharynx: Posterior oropharyngeal erythema present.      Comments: PND    Eyes:      Conjunctiva/sclera: Conjunctivae normal.   Cardiovascular:      Rate and Rhythm: Normal rate and regular rhythm.      Pulses: Normal pulses.      Heart sounds: Normal heart sounds.   Pulmonary:      Effort: Pulmonary effort is normal. No respiratory distress.      Breath sounds: Normal  breath sounds.   Lymphadenopathy:      Head:      Right side of head: No submental, submandibular or tonsillar adenopathy.      Left side of head: No submental, submandibular or tonsillar adenopathy.      Cervical: No cervical adenopathy.   Skin:     General: Skin is warm and dry.      Capillary Refill: Capillary refill takes less than 2 seconds.      Findings: No rash.   Neurological:      Mental Status: She is alert and oriented to person, place, and time.   Psychiatric:         Behavior: Behavior normal.         Thought Content: Thought content normal.         Judgment: Judgment normal.              Results Review:    I reviewed the patient's previous clinical results.       Medication Review:     Current Outpatient Medications:   •  Probiotic Product (Envive) capsule, Take 1 capsule by mouth Daily., Disp: 10 capsule, Rfl: 0  •  TRI-ESTARYLLA 0.18/0.215/0.25 MG-35 MCG per tablet, TAKE 1 TABLET BY MOUTH EVERY DAY, Disp: , Rfl: 5  •  dicyclomine (BENTYL) 10 MG capsule, TAKE 1 CAPSULE BY MOUTH 4 (FOUR) TIMES A DAY BEFORE MEALS & AT BEDTIME., Disp: 270 capsule, Rfl: 1  •  fluticasone (Flonase) 50 MCG/ACT nasal spray, 2 sprays into the nostril(s) as directed by provider Daily., Disp: 15.8 mL, Rfl: 1  •  Magnesium Oxide 500 MG tablet, Take 1 tablet by mouth Daily., Disp: 30 tablet, Rfl: 3    Diagnoses and all orders for this visit:    Migraine without aura and without status migrainosus, not intractable  -     Magnesium Oxide 500 MG tablet; Take 1 tablet by mouth Daily.  Advised patient to get up-to-date on vision and dental also advised her to keep headache/migraine journal and try to identify triggers.  Discussed over-the-counter medications recommend getting adequate rest hydration nutrition.  Discussed worsening signs and symptoms in addition to recommend massage.  Discussed medication options dosage side effects and indications  Allergic rhinitis, unspecified seasonality, unspecified trigger  -     fluticasone  (Flonase) 50 MCG/ACT nasal spray; 2 sprays into the nostril(s) as directed by provider Daily.    I spent 30 minutes caring for Araseli on this date of service. This time includes time spent by me in the following activities:obtaining and/or reviewing a separately obtained history, performing a medically appropriate examination and/or evaluation , counseling and educating the patient/family/caregiver, ordering medications, tests, or procedures and documenting information in the medical record    Discussed health maintenance components with patient she stated understanding    Return if symptoms worsen or fail to improve.    Karlene Monterroso, APRN  02/09/2022

## 2022-02-21 DIAGNOSIS — J30.9 ALLERGIC RHINITIS, UNSPECIFIED SEASONALITY, UNSPECIFIED TRIGGER: ICD-10-CM

## 2022-02-21 RX ORDER — FLUTICASONE PROPIONATE 50 MCG
2 SPRAY, SUSPENSION (ML) NASAL DAILY
Qty: 48 ML | Refills: 1 | Status: SHIPPED | OUTPATIENT
Start: 2022-02-21 | End: 2022-02-24 | Stop reason: SDUPTHER

## 2022-02-24 DIAGNOSIS — J30.9 ALLERGIC RHINITIS, UNSPECIFIED SEASONALITY, UNSPECIFIED TRIGGER: ICD-10-CM

## 2022-02-24 RX ORDER — FLUTICASONE PROPIONATE 50 MCG
2 SPRAY, SUSPENSION (ML) NASAL DAILY
Qty: 48 ML | Refills: 1 | Status: SHIPPED | OUTPATIENT
Start: 2022-02-24 | End: 2022-08-22

## 2022-02-28 DIAGNOSIS — G43.009 MIGRAINE WITHOUT AURA AND WITHOUT STATUS MIGRAINOSUS, NOT INTRACTABLE: ICD-10-CM

## 2022-03-02 RX ORDER — TRANSPARENT DRESSING 2.36X2.75"
BANDAGE TOPICAL
Qty: 90 TABLET | Refills: 1 | Status: SHIPPED | OUTPATIENT
Start: 2022-03-02 | End: 2022-08-22

## 2022-03-18 ENCOUNTER — TELEPHONE (OUTPATIENT)
Dept: GASTROENTEROLOGY | Facility: CLINIC | Age: 22
End: 2022-03-18

## 2022-03-18 NOTE — TELEPHONE ENCOUNTER
1st attempt:  Called patient re: overdue lab orders.  She does not wish to pursue these further.. She states she was told that they were optional and she did not feel that these are needed.  If there are any issues that arise, she will call us.

## 2022-08-22 ENCOUNTER — CLINICAL SUPPORT (OUTPATIENT)
Dept: INTERNAL MEDICINE | Facility: CLINIC | Age: 22
End: 2022-08-22

## 2022-08-22 ENCOUNTER — TELEPHONE (OUTPATIENT)
Dept: INTERNAL MEDICINE | Facility: CLINIC | Age: 22
End: 2022-08-22

## 2022-08-22 ENCOUNTER — OFFICE VISIT (OUTPATIENT)
Dept: INTERNAL MEDICINE | Facility: CLINIC | Age: 22
End: 2022-08-22

## 2022-08-22 VITALS
SYSTOLIC BLOOD PRESSURE: 126 MMHG | RESPIRATION RATE: 16 BRPM | BODY MASS INDEX: 25.18 KG/M2 | HEIGHT: 69 IN | WEIGHT: 170 LBS | HEART RATE: 87 BPM | OXYGEN SATURATION: 100 % | TEMPERATURE: 98.1 F | DIASTOLIC BLOOD PRESSURE: 73 MMHG

## 2022-08-22 DIAGNOSIS — M54.50 ACUTE RIGHT-SIDED LOW BACK PAIN WITHOUT SCIATICA: ICD-10-CM

## 2022-08-22 DIAGNOSIS — R19.7 DIARRHEA, UNSPECIFIED TYPE: ICD-10-CM

## 2022-08-22 DIAGNOSIS — Z23 NEED FOR TDAP VACCINATION: Primary | ICD-10-CM

## 2022-08-22 LAB
BILIRUB BLD-MCNC: NEGATIVE MG/DL
CLARITY, POC: CLEAR
COLOR UR: NORMAL
EXPIRATION DATE: NORMAL
GLUCOSE UR STRIP-MCNC: NEGATIVE MG/DL
KETONES UR QL: NEGATIVE
LEUKOCYTE EST, POC: NEGATIVE
Lab: NORMAL
NITRITE UR-MCNC: NEGATIVE MG/ML
PH UR: 6 [PH] (ref 5–8)
PROT UR STRIP-MCNC: NEGATIVE MG/DL
RBC # UR STRIP: NEGATIVE /UL
SP GR UR: 1.02 (ref 1–1.03)
UROBILINOGEN UR QL: NORMAL

## 2022-08-22 PROCEDURE — 90715 TDAP VACCINE 7 YRS/> IM: CPT | Performed by: NURSE PRACTITIONER

## 2022-08-22 PROCEDURE — 99213 OFFICE O/P EST LOW 20 MIN: CPT | Performed by: NURSE PRACTITIONER

## 2022-08-22 PROCEDURE — 90471 IMMUNIZATION ADMIN: CPT | Performed by: NURSE PRACTITIONER

## 2022-08-22 PROCEDURE — 81003 URINALYSIS AUTO W/O SCOPE: CPT | Performed by: NURSE PRACTITIONER

## 2022-08-22 RX ORDER — DICYCLOMINE HYDROCHLORIDE 10 MG/1
10 CAPSULE ORAL
Qty: 270 CAPSULE | Refills: 1 | Status: SHIPPED | OUTPATIENT
Start: 2022-08-22 | End: 2022-11-28

## 2022-08-22 NOTE — TELEPHONE ENCOUNTER
Left VM for pt to return phone call-pt is scheduled for a physical, but she is not due till after Oct 5th for her annual.  Called to see if pt had other needs or need to reschedule appointment.

## 2022-11-25 DIAGNOSIS — R19.7 DIARRHEA, UNSPECIFIED TYPE: ICD-10-CM

## 2022-11-28 RX ORDER — DICYCLOMINE HYDROCHLORIDE 10 MG/1
10 CAPSULE ORAL
Qty: 360 CAPSULE | Refills: 1 | Status: SHIPPED | OUTPATIENT
Start: 2022-11-28

## 2023-02-03 ENCOUNTER — OFFICE VISIT (OUTPATIENT)
Dept: INTERNAL MEDICINE | Facility: CLINIC | Age: 23
End: 2023-02-03
Payer: COMMERCIAL

## 2023-02-03 VITALS
WEIGHT: 171.8 LBS | BODY MASS INDEX: 25.45 KG/M2 | SYSTOLIC BLOOD PRESSURE: 124 MMHG | HEART RATE: 75 BPM | HEIGHT: 69 IN | OXYGEN SATURATION: 100 % | TEMPERATURE: 98.6 F | DIASTOLIC BLOOD PRESSURE: 68 MMHG | RESPIRATION RATE: 18 BRPM

## 2023-02-03 DIAGNOSIS — Z13.21 SCREENING FOR ENDOCRINE, NUTRITIONAL, METABOLIC AND IMMUNITY DISORDER: ICD-10-CM

## 2023-02-03 DIAGNOSIS — F33.1 MODERATE EPISODE OF RECURRENT MAJOR DEPRESSIVE DISORDER: ICD-10-CM

## 2023-02-03 DIAGNOSIS — G47.9 SLEEPING DIFFICULTIES: ICD-10-CM

## 2023-02-03 DIAGNOSIS — R17 ELEVATED BILIRUBIN: ICD-10-CM

## 2023-02-03 DIAGNOSIS — R39.9 URINARY SYMPTOM OR SIGN: ICD-10-CM

## 2023-02-03 DIAGNOSIS — Z13.228 SCREENING FOR ENDOCRINE, NUTRITIONAL, METABOLIC AND IMMUNITY DISORDER: ICD-10-CM

## 2023-02-03 DIAGNOSIS — F41.9 ANXIETY: ICD-10-CM

## 2023-02-03 DIAGNOSIS — R10.13 EPIGASTRIC PAIN: ICD-10-CM

## 2023-02-03 DIAGNOSIS — Z00.00 PHYSICAL EXAM, ANNUAL: Primary | ICD-10-CM

## 2023-02-03 DIAGNOSIS — Z13.0 SCREENING FOR ENDOCRINE, NUTRITIONAL, METABOLIC AND IMMUNITY DISORDER: ICD-10-CM

## 2023-02-03 DIAGNOSIS — Z13.29 SCREENING FOR ENDOCRINE, NUTRITIONAL, METABOLIC AND IMMUNITY DISORDER: ICD-10-CM

## 2023-02-03 LAB
BILIRUB BLD-MCNC: ABNORMAL MG/DL
CLARITY, POC: CLEAR
COLOR UR: ABNORMAL
EXPIRATION DATE: ABNORMAL
GLUCOSE UR STRIP-MCNC: NEGATIVE MG/DL
KETONES UR QL: ABNORMAL
LEUKOCYTE EST, POC: NEGATIVE
Lab: ABNORMAL
NITRITE UR-MCNC: NEGATIVE MG/ML
PH UR: 6 [PH] (ref 5–8)
PROT UR STRIP-MCNC: ABNORMAL MG/DL
RBC # UR STRIP: NEGATIVE /UL
SP GR UR: 1.03 (ref 1–1.03)
UROBILINOGEN UR QL: NORMAL

## 2023-02-03 PROCEDURE — 81003 URINALYSIS AUTO W/O SCOPE: CPT | Performed by: NURSE PRACTITIONER

## 2023-02-03 PROCEDURE — 96127 BRIEF EMOTIONAL/BEHAV ASSMT: CPT | Performed by: NURSE PRACTITIONER

## 2023-02-03 PROCEDURE — 99395 PREV VISIT EST AGE 18-39: CPT | Performed by: NURSE PRACTITIONER

## 2023-02-03 RX ORDER — HYDROXYZINE HYDROCHLORIDE 25 MG/1
25 TABLET, FILM COATED ORAL 3 TIMES DAILY PRN
Qty: 90 TABLET | Refills: 0 | Status: SHIPPED | OUTPATIENT
Start: 2023-02-03 | End: 2023-03-06

## 2023-02-03 RX ORDER — OMEPRAZOLE 20 MG/1
20 CAPSULE, DELAYED RELEASE ORAL NIGHTLY
Qty: 30 CAPSULE | Refills: 1 | Status: SHIPPED | OUTPATIENT
Start: 2023-02-03

## 2023-02-03 NOTE — PROGRESS NOTES
Chief Complaint   Patient presents with   • Annual Exam     W/ pap       Araseli Martins is a 22 y.o. female and is here for a comprehensive physical exam. The patient reports epigastric pain but denies any nausea vomiting weight loss or blood in urine or stool.  Denies fever or chills.  Patient has been seen by GI in the past and they did report irritable bowel and possible Gilbert's syndrome's as patient does have elevated bilirubin.  Patient also reports anxiety and depression but denies SI or HI she states that she has had it for a long time but has never really dealt with it.     History:  LMP: No LMP recorded. (Menstrual status: Oral contraceptives).  Last pap date:not reported   Abnormal pap? no  : 0  Para: 0  STD:none  Age of menarche:11  Sexually active:17 (males)  Abuse:none  Has boyfriend    Do you take any herbs or supplements that were not prescribed by a doctor? no  Are you taking calcium supplements? no  Are you taking aspirin daily? no      Health Habits:  Dental Exam. not up to date - Advised patient to schedule  Eye Exam. not up to date - Patient scheduled  Dermatology.not up to date - Advised patient to schedule or  closely monitor for changes  Exercise: 4 times/week.  Current exercise activities include: walking    Health Maintenance   Topic Date Due   • HPV VACCINES (2 - 3-dose series) 2015   • PAP SMEAR  Never done   • INFLUENZA VACCINE  Never done   • ANNUAL PHYSICAL  10/04/2022   • COVID-19 Vaccine (1) 2024 (Originally 1999)   • TDAP/TD VACCINES (3 - Td or Tdap) 2032   • HEPATITIS C SCREENING  Completed   • Pneumococcal Vaccine 0-64  Aged Out       PMH, PSH, SocHx, FamHx, Allergies, and Medications: Reviewed and updated in the Visit Navigator.     Allergies   Allergen Reactions   • Penicillins Rash     Past Medical History:   Diagnosis Date   • Anemia    • Gallstones    • Iron deficiency    • Snores      Past Surgical History:   Procedure Laterality Date   •  "CHOLECYSTECTOMY      at age 7, + stones     Social History     Socioeconomic History   • Marital status: Single   Tobacco Use   • Smoking status: Never   • Smokeless tobacco: Never   Vaping Use   • Vaping Use: Never used   Substance and Sexual Activity   • Alcohol use: Yes     Comment: social   • Drug use: Never   • Sexual activity: Defer     Family History   Problem Relation Age of Onset   • Migraines Mother    • Osteoporosis Maternal Grandmother    • Stroke Maternal Grandmother    • Diabetes Maternal Grandfather    • Heart attack Maternal Grandfather    • Hypertension Maternal Grandfather    • Stroke Paternal Grandmother    • Diabetes Cousin    • Hypertension Father    • Colon cancer Neg Hx        Review of Systems  Review of Systems      Vitals:    02/03/23 1305   BP: 124/68   Pulse: 75   Resp: 18   Temp: 98.6 °F (37 °C)   SpO2: 100%       Objective   /68   Pulse 75   Temp 98.6 °F (37 °C) (Temporal)   Resp 18   Ht 175.3 cm (69\")   Wt 77.9 kg (171 lb 12.8 oz)   SpO2 100%   BMI 25.37 kg/m²     Physical Exam  Vitals and nursing note reviewed. Exam conducted with a chaperone present.   Constitutional:       General: She is not in acute distress.     Appearance: Normal appearance. She is well-developed.   HENT:      Head: Normocephalic and atraumatic.      Right Ear: Hearing, tympanic membrane and ear canal normal.      Left Ear: Hearing, tympanic membrane and ear canal normal.      Nose: Nose normal.      Mouth/Throat:      Dentition: Normal dentition.   Eyes:      Conjunctiva/sclera: Conjunctivae normal.      Pupils: Pupils are equal, round, and reactive to light.   Neck:      Thyroid: No thyroid mass or thyromegaly.      Vascular: No carotid bruit or JVD.   Cardiovascular:      Rate and Rhythm: Normal rate and regular rhythm.      Pulses: Normal pulses.      Heart sounds: Normal heart sounds, S1 normal and S2 normal. No murmur heard.  Pulmonary:      Effort: Pulmonary effort is normal.      Breath " sounds: Normal breath sounds.   Chest:   Breasts:     Right: Tenderness present.      Left: Tenderness present.      Comments: Fibrocystic breast noted bilateral.  Abdominal:      General: Bowel sounds are normal. There is no distension or abdominal bruit.      Palpations: Abdomen is soft. There is no mass.      Tenderness: There is no abdominal tenderness. There is no right CVA tenderness, left CVA tenderness, guarding or rebound.      Hernia: No hernia is present.   Genitourinary:     Vagina: Normal.      Cervix: Normal.      Adnexa: Right adnexa normal and left adnexa normal.      Comments: Vaginal dryness noted.  Cervix position downward.  Musculoskeletal:         General: Normal range of motion.      Cervical back: Normal range of motion and neck supple.   Lymphadenopathy:      Cervical: No cervical adenopathy.   Skin:     General: Skin is warm and dry.      Capillary Refill: Capillary refill takes less than 2 seconds.      Findings: No rash.      Nails: There is no clubbing.   Neurological:      Mental Status: She is alert and oriented to person, place, and time.      Cranial Nerves: No cranial nerve deficit.      Sensory: No sensory deficit.      Gait: Gait normal.   Psychiatric:         Behavior: Behavior normal.         Thought Content: Thought content normal.         Judgment: Judgment normal.         Anxiety SANDRA-7 2/3/2023   Feeling nervous, anxious or on edge 1   Not being able to stop or control worrying 3   Worrying too much about different things 2   Trouble Relaxing 1   Being so restless that it is hard to sit still 0   Becoming easily annoyed or irritable 1   Feeling afraid as if something awful might happen 3   SANDRA 7 Total Score 11   If you checked any problems, how difficult have these problems made it for you to do your work, take care of things at home, or get along with other people Somewhat difficult   PHQ-9 Depression Screening  Little interest or pleasure in doing things? 1-->several days    Feeling down, depressed, or hopeless? 1-->several days   Trouble falling or staying asleep, or sleeping too much? 3-->nearly every day   Feeling tired or having little energy? 1-->several days   Poor appetite or overeating? 0-->not at all   Feeling bad about yourself - or that you are a failure or have let yourself or your family down? 1-->several days   Trouble concentrating on things, such as reading the newspaper or watching television? 0-->not at all   Moving or speaking so slowly that other people could have noticed? Or the opposite - being so fidgety or restless that you have been moving around a lot more than usual? 0-->not at all   Thoughts that you would be better off dead, or of hurting yourself in some way? 0-->not at all   PHQ-9 Total Score 7   If you checked off any problems, how difficult have these problems made it for you to do your work, take care of things at home, or get along with other people? somewhat difficult            Assessment & Plan   1. Healthy female exam.    2. Patient Counseling: Including but not Limited to the following, when appropriate:  --Nutrition: Stressed importance of moderation in sodium/caffeine intake, saturated fat and cholesterol, caloric balance, sufficient intake of fresh fruits, vegetables, fiber, calcium, iron, and 1 mg of folate supplement per day (for females capable of pregnancy).  --Discussed the issue of estrogen replacement, calcium supplement, and the daily use of baby aspirin.  --Exercise: Stressed the importance of regular exercise.   --Substance Abuse: Discussed cessation/primary prevention of tobacco, alcohol, or other drug use; driving or other dangerous activities under the influence; availability of treatment for abuse, as indicated based on social history.    --Sexuality: Discussed sexually transmitted diseases, partner selection, use of condoms, avoidance of unintended pregnancy  and contraceptive alternatives.   --Injury prevention: Discussed  safety belts, safety helmets, smoke detector, smoking near bedding or upholstery.   --Dental health: Discussed importance of regular tooth brushing, flossing, and dental visits.  --Immunizations reviewed.  --Discussed benefits of regular vision and dental screening       3. Discussed the patient's BMI with her.  The BMI is in the acceptable range  4. Return in about 4 weeks (around 3/3/2023), or if symptoms worsen or fail to improve.  5. Age-appropriate Screening Scheduled      Assessment & Plan     Diagnoses and all orders for this visit:    1. Physical exam, annual (Primary)  -     LIQUID-BASED PAP SMEAR, [facility] ([facility])  -     Comprehensive metabolic panel  -     Lipid panel  -     CBC w AUTO Differential    2. Urinary symptom or sign  -     POCT urinalysis dipstick, automated  -     Urine Culture - Urine, Urine, Clean Catch    3. Epigastric pain  Omeprazole ordered.  Discussed dietary modifications along with worsening signs and symptoms may need referral to GI  4. Screening for endocrine, nutritional, metabolic and immunity disorder  -     Hemoglobin A1c  -     Iron and TIBC  -     Ferritin  -     Vitamin B12  -     TSH  -     T4, free    5. Elevated bilirubin  -     Bilirubin, Total & Direct    6. Anxiety  -     hydrOXYzine (ATARAX) 25 MG tablet; Take 1 tablet by mouth 3 (Three) Times a Day As Needed for Itching, Allergies or Anxiety.  Dispense: 90 tablet; Refill: 0  Discussed medication options dosage side effects and indications advised patient to get adequate rest hydration nutrition.  7. Moderate episode of recurrent major depressive disorder (HCC)  Discussed nonpharmacological interventions along with worsening signs and symptoms.  8. Sleeping difficulties  -     hydrOXYzine (ATARAX) 25 MG tablet; Take 1 tablet by mouth 3 (Three) Times a Day As Needed for Itching, Allergies or Anxiety.  Dispense: 90 tablet; Refill: 0    Discussed sleep hygiene recommend getting adequate rest hydration  nutrition.             Karlene Monterroso, APRN 02/03/2023

## 2023-02-06 LAB — REF LAB TEST METHOD: NORMAL

## 2023-02-07 LAB
BACTERIA UR CULT: NORMAL
BACTERIA UR CULT: NORMAL

## 2023-02-17 ENCOUNTER — OFFICE VISIT (OUTPATIENT)
Dept: INTERNAL MEDICINE | Facility: CLINIC | Age: 23
End: 2023-02-17
Payer: COMMERCIAL

## 2023-02-17 ENCOUNTER — HOSPITAL ENCOUNTER (OUTPATIENT)
Dept: GENERAL RADIOLOGY | Facility: HOSPITAL | Age: 23
Discharge: HOME | End: 2023-02-17
Admitting: NURSE PRACTITIONER
Payer: COMMERCIAL

## 2023-02-17 ENCOUNTER — PATIENT MESSAGE (OUTPATIENT)
Dept: INTERNAL MEDICINE | Facility: CLINIC | Age: 23
End: 2023-02-17

## 2023-02-17 VITALS
OXYGEN SATURATION: 96 % | BODY MASS INDEX: 25.48 KG/M2 | TEMPERATURE: 99 F | HEIGHT: 69 IN | WEIGHT: 172 LBS | HEART RATE: 81 BPM | DIASTOLIC BLOOD PRESSURE: 80 MMHG | SYSTOLIC BLOOD PRESSURE: 116 MMHG

## 2023-02-17 DIAGNOSIS — M25.562 CHRONIC PAIN OF BOTH KNEES: Primary | ICD-10-CM

## 2023-02-17 DIAGNOSIS — G89.29 CHRONIC PAIN OF BOTH KNEES: Primary | ICD-10-CM

## 2023-02-17 DIAGNOSIS — M25.561 CHRONIC PAIN OF BOTH KNEES: Primary | ICD-10-CM

## 2023-02-17 PROCEDURE — 99213 OFFICE O/P EST LOW 20 MIN: CPT | Performed by: NURSE PRACTITIONER

## 2023-02-17 PROCEDURE — 73562 X-RAY EXAM OF KNEE 3: CPT

## 2023-02-17 NOTE — TELEPHONE ENCOUNTER
From: Araseli Martins  To: Zarina Menendez  Sent: 2/17/2023 1:56 PM EST  Subject: Question regarding XR KNEE 3 VW BILATERAL    I forgot the Orthopedic center you are referring me to. Would you be able to give me the number of the Orthopedic you sent the referral to? Thanks!

## 2023-02-17 NOTE — PROGRESS NOTES
"Chief Complaint   Patient presents with   • Knee Pain     Bilaterally, worse in L, X 5 years+, worsening over last 1.5-2 weeks, worsened with movement, requesting referral to ortho     Subjective   Araseli Martins is a 22 y.o. female.     History of Present Illness     22-year-old female presents with bilateral knee pain.  Currently her left knee is bothering her the most.  This has been an ongoing issue over 5 years.  It has been worsening the past 1-1/2-2 weeks.  Pain is worse with movement such as going up or down stairs, any kind of movement where she has to bend her knees.  She has never had any injuries before in the past.  She used to play sports in high school.  She is requesting a referral to [facility] for further evaluation.  She denies any edema, erythema, warmth.  States that her right knee hurts in the anterior lower part of knee.  Right knee hurts in the lateral aspect.  Pain today is 4 out of 10 in the left knee.  No fever, myalgia, chills.       The following portions of the patient's history were reviewed and updated as appropriate: allergies, current medications, past family history, past medical history, past social history, past surgical history and problem list.      Objective   /80 (BP Location: Right arm, Patient Position: Sitting, Cuff Size: Adult)   Pulse 81   Temp 99 °F (37.2 °C) (Temporal)   Ht 175.3 cm (69\")   Wt 78 kg (172 lb)   LMP 01/27/2023 (Approximate)   SpO2 96%   BMI 25.40 kg/m²   Body mass index is 25.4 kg/m².  Physical Exam  Vitals and nursing note reviewed.   Constitutional:       Appearance: Normal appearance.      Interventions: Face mask in place.   HENT:      Right Ear: External ear normal.      Left Ear: External ear normal.   Eyes:      Extraocular Movements: Extraocular movements intact.   Cardiovascular:      Rate and Rhythm: Normal rate and regular rhythm.   Pulmonary:      Effort: Pulmonary effort is normal.      Breath sounds: Normal breath " sounds.   Musculoskeletal:         General: Normal range of motion.      Cervical back: Normal range of motion.      Comments: Bilateral knee appearance normal, no edema, erythema, warmth, pain with flexion left knee   Skin:     General: Skin is dry.   Neurological:      Mental Status: She is alert and oriented to person, place, and time.   Psychiatric:         Mood and Affect: Mood normal.       Imaging  XR Knee 3 View Bilateral    Result Date: 2/17/2023  Impression: Normal right and left knee Electronically Signed: Feng EASTON Marine  2/17/2023 10:36 AM EST  Workstation ID: [id number]      Assessment & Plan   Araseli Martins is here today and the following problems have been addressed:      Diagnoses and all orders for this visit:    1. Chronic pain of both knees (Primary)  -     XR Knee 3 View Bilateral  -     Ambulatory Referral to Orthopedic Surgery    X-ray of bilateral knees normal appearance.  Referral placed to [facility] per patient request.  She should be able to call the clinic and schedule her own appointment if her insurance does not require referral.  I would recommend wearing a knee stabilization brace, rest, ice, elevation of extremity.  Can take NSAIDs over-the-counter as needed or Tylenol.    Follow Up   Return if symptoms worsen or fail to improve.  Patient was given instructions and counseling regarding her condition or for health maintenance advice. Please see specific information pulled into the AVS if appropriate.     Zarina BROWN  [facility]

## 2023-03-05 DIAGNOSIS — F41.9 ANXIETY: ICD-10-CM

## 2023-03-05 DIAGNOSIS — G47.9 SLEEPING DIFFICULTIES: ICD-10-CM

## 2023-03-06 RX ORDER — HYDROXYZINE HYDROCHLORIDE 25 MG/1
25 TABLET, FILM COATED ORAL 3 TIMES DAILY PRN
Qty: 90 TABLET | Refills: 0 | Status: SHIPPED | OUTPATIENT
Start: 2023-03-06

## 2023-03-06 NOTE — TELEPHONE ENCOUNTER
Rx Refill Note  Requested Prescriptions     Pending Prescriptions Disp Refills   • hydrOXYzine (ATARAX) 25 MG tablet [Pharmacy Med Name: HYDROXYZINE HCL 25 MG TABLET] 90 tablet 0     Sig: TAKE 1 TABLET BY MOUTH 3 (THREE) TIMES A DAY AS NEEDED FOR ITCHING, ALLERGIES OR ANXIETY.      Last office visit with prescribing clinician: 2/17/23  Last telemedicine visit with prescribing clinician: Visit date not found   Next office visit with prescribing clinician: Visit date not found                         Would you like a call back once the refill request has been completed: [] Yes [] No    If the office needs to give you a call back, can they leave a voicemail: [] Yes [] No    Brock Johnson MA  03/06/23, 08:57 EST

## 2023-06-30 ENCOUNTER — OFFICE VISIT (OUTPATIENT)
Dept: INTERNAL MEDICINE | Facility: CLINIC | Age: 23
End: 2023-06-30
Payer: COMMERCIAL

## 2023-06-30 VITALS
BODY MASS INDEX: 23.55 KG/M2 | RESPIRATION RATE: 18 BRPM | HEIGHT: 69 IN | TEMPERATURE: 98 F | HEART RATE: 85 BPM | OXYGEN SATURATION: 100 % | DIASTOLIC BLOOD PRESSURE: 71 MMHG | SYSTOLIC BLOOD PRESSURE: 111 MMHG | WEIGHT: 159 LBS

## 2023-06-30 DIAGNOSIS — R10.30 LOWER ABDOMINAL PAIN: Primary | ICD-10-CM

## 2023-06-30 LAB
BILIRUB BLD-MCNC: NEGATIVE MG/DL
CLARITY, POC: CLEAR
COLOR UR: YELLOW
EXPIRATION DATE: NORMAL
GLUCOSE UR STRIP-MCNC: NEGATIVE MG/DL
KETONES UR QL: NEGATIVE
LEUKOCYTE EST, POC: NEGATIVE
Lab: NORMAL
NITRITE UR-MCNC: NEGATIVE MG/ML
PH UR: 6.5 [PH] (ref 5–8)
PROT UR STRIP-MCNC: NEGATIVE MG/DL
RBC # UR STRIP: NEGATIVE /UL
SP GR UR: 1.01 (ref 1–1.03)
UROBILINOGEN UR QL: NORMAL

## 2023-06-30 PROCEDURE — 99213 OFFICE O/P EST LOW 20 MIN: CPT | Performed by: NURSE PRACTITIONER

## 2023-06-30 PROCEDURE — 81003 URINALYSIS AUTO W/O SCOPE: CPT | Performed by: NURSE PRACTITIONER

## 2023-07-11 LAB
ALBUMIN SERPL-MCNC: 4.8 G/DL (ref 3.5–5.2)
ALBUMIN/GLOB SERPL: 2 G/DL
ALP SERPL-CCNC: 58 U/L (ref 39–117)
ALT SERPL-CCNC: 21 U/L (ref 1–33)
AST SERPL-CCNC: 15 U/L (ref 1–32)
BASOPHILS # BLD AUTO: 0.04 10*3/MM3 (ref 0–0.2)
BASOPHILS NFR BLD AUTO: 0.7 % (ref 0–1.5)
BILIRUB DIRECT SERPL-MCNC: 0.4 MG/DL (ref 0–0.3)
BILIRUB INDIRECT SERPL-MCNC: 2.4 MG/DL
BILIRUB SERPL-MCNC: 2.8 MG/DL (ref 0–1.2)
BUN SERPL-MCNC: 12 MG/DL (ref 6–20)
BUN/CREAT SERPL: 19 (ref 7–25)
CALCIUM SERPL-MCNC: 9.7 MG/DL (ref 8.6–10.5)
CHLORIDE SERPL-SCNC: 107 MMOL/L (ref 98–107)
CHOLEST SERPL-MCNC: 140 MG/DL (ref 0–200)
CO2 SERPL-SCNC: 25.6 MMOL/L (ref 22–29)
CREAT SERPL-MCNC: 0.63 MG/DL (ref 0.57–1)
EGFRCR SERPLBLD CKD-EPI 2021: 127.2 ML/MIN/1.73
EOSINOPHIL # BLD AUTO: 0.11 10*3/MM3 (ref 0–0.4)
EOSINOPHIL NFR BLD AUTO: 2 % (ref 0.3–6.2)
ERYTHROCYTE [DISTWIDTH] IN BLOOD BY AUTOMATED COUNT: 11.7 % (ref 12.3–15.4)
FERRITIN SERPL-MCNC: 92.7 NG/ML (ref 13–150)
GLOBULIN SER CALC-MCNC: 2.4 GM/DL
GLUCOSE SERPL-MCNC: 93 MG/DL (ref 65–99)
HBA1C MFR BLD: 4.9 % (ref 4.8–5.6)
HCT VFR BLD AUTO: 36.5 % (ref 34–46.6)
HDLC SERPL-MCNC: 70 MG/DL (ref 40–60)
HGB BLD-MCNC: 12.5 G/DL (ref 12–15.9)
IMM GRANULOCYTES # BLD AUTO: 0.02 10*3/MM3 (ref 0–0.05)
IMM GRANULOCYTES NFR BLD AUTO: 0.4 % (ref 0–0.5)
IRON SATN MFR SERPL: 36 % (ref 20–50)
IRON SERPL-MCNC: 124 MCG/DL (ref 37–145)
LDLC SERPL CALC-MCNC: 60 MG/DL (ref 0–100)
LYMPHOCYTES # BLD AUTO: 1.73 10*3/MM3 (ref 0.7–3.1)
LYMPHOCYTES NFR BLD AUTO: 31.7 % (ref 19.6–45.3)
MCH RBC QN AUTO: 32.4 PG (ref 26.6–33)
MCHC RBC AUTO-ENTMCNC: 34.2 G/DL (ref 31.5–35.7)
MCV RBC AUTO: 94.6 FL (ref 79–97)
MONOCYTES # BLD AUTO: 0.42 10*3/MM3 (ref 0.1–0.9)
MONOCYTES NFR BLD AUTO: 7.7 % (ref 5–12)
NEUTROPHILS # BLD AUTO: 3.13 10*3/MM3 (ref 1.7–7)
NEUTROPHILS NFR BLD AUTO: 57.5 % (ref 42.7–76)
NRBC BLD AUTO-RTO: 0 /100 WBC (ref 0–0.2)
PLATELET # BLD AUTO: 205 10*3/MM3 (ref 140–450)
POTASSIUM SERPL-SCNC: 4.4 MMOL/L (ref 3.5–5.2)
PROT SERPL-MCNC: 7.2 G/DL (ref 6–8.5)
RBC # BLD AUTO: 3.86 10*6/MM3 (ref 3.77–5.28)
SODIUM SERPL-SCNC: 144 MMOL/L (ref 136–145)
T4 FREE SERPL-MCNC: 1.13 NG/DL (ref 0.93–1.7)
TIBC SERPL-MCNC: 346 MCG/DL
TRIGL SERPL-MCNC: 45 MG/DL (ref 0–150)
TSH SERPL DL<=0.005 MIU/L-ACNC: 2.73 UIU/ML (ref 0.27–4.2)
UIBC SERPL-MCNC: 222 MCG/DL (ref 112–346)
VIT B12 SERPL-MCNC: 667 PG/ML (ref 211–946)
VLDLC SERPL CALC-MCNC: 10 MG/DL (ref 5–40)
WBC # BLD AUTO: 5.45 10*3/MM3 (ref 3.4–10.8)

## 2023-07-24 NOTE — PROGRESS NOTES
Chief Complaint / Reason:      Chief Complaint   Patient presents with    Abdominal Pain     Right lower side x 2 months        Subjective     Abdominal Pain    Patient reports with complaints of right lower abdominal pain that has been going on for about 2 months.  She states that it comes and goes but always feels like there is a dull ache she does have history of colitis and has seen GI in the past.  Denies fever or chills and denies any blood in urine or stool.  Patient does not have a gallbladder and states that foods does make her symptoms worse.  History taken from: patient    PMH/FH/Social History were reviewed and updated appropriately in the electronic medical record.   Past Medical History:   Diagnosis Date    Anemia     Anxiety     Gallstones     Iron deficiency     Snores      Past Surgical History:   Procedure Laterality Date    CHOLECYSTECTOMY      at age 7, + stones     Social History     Socioeconomic History    Marital status: Single   Tobacco Use    Smoking status: Never    Smokeless tobacco: Never   Vaping Use    Vaping Use: Never used   Substance and Sexual Activity    Alcohol use: Not Currently     Comment: social    Drug use: Never    Sexual activity: Yes     Partners: Male     Birth control/protection: Pill     Family History   Problem Relation Age of Onset    Migraines Mother     Osteoporosis Maternal Grandmother     Stroke Maternal Grandmother     Diabetes Maternal Grandfather     Heart attack Maternal Grandfather     Hypertension Maternal Grandfather     Stroke Paternal Grandmother     Diabetes Cousin     Hypertension Father     Colon cancer Neg Hx        Review of Systems:   Review of Systems   Gastrointestinal:  Positive for abdominal pain.       All other systems were reviewed and are negative.  Exceptions are noted in the subjective or above.      Objective     Vital Signs  Vitals:    06/30/23 1129   BP: 111/71   Pulse: 85   Resp: 18   Temp: 98 °F (36.7 °C)   SpO2: 100%       Body  mass index is 23.48 kg/m².  BMI is within normal parameters. No other follow-up for BMI required.      Physical Exam  Vitals and nursing note reviewed.   Constitutional:       General: She is not in acute distress.     Appearance: She is well-developed.   Cardiovascular:      Rate and Rhythm: Normal rate and regular rhythm.      Pulses: Normal pulses.      Heart sounds: Normal heart sounds.   Pulmonary:      Effort: Pulmonary effort is normal.      Breath sounds: Normal breath sounds. No wheezing.   Chest:      Chest wall: No tenderness.   Abdominal:      General: Bowel sounds are normal.      Palpations: Abdomen is soft.      Tenderness: There is abdominal tenderness.   Skin:     General: Skin is warm and dry.      Capillary Refill: Capillary refill takes less than 2 seconds.      Coloration: Skin is not pale.      Findings: No erythema or rash.   Neurological:      Mental Status: She is alert and oriented to person, place, and time.   Psychiatric:         Behavior: Behavior normal.         Thought Content: Thought content normal.         Judgment: Judgment normal.            Results Review:    I reviewed the patient's new clinical results.       Medication Review:     Current Outpatient Medications:     dicyclomine (BENTYL) 10 MG capsule, TAKE 1 CAPSULE BY MOUTH 4 (FOUR) TIMES A DAY BEFORE MEALS & AT BEDTIME., Disp: 360 capsule, Rfl: 1    Diagnoses and all orders for this visit:    Lower abdominal pain  -     US Non-ob Transvaginal  -     POCT urinalysis dipstick, automated    Discussed differential diagnosis along with worsening signs and symptoms.  Advised patient to follow-up with GI.      Return if symptoms worsen or fail to improve.    Karlene Monterroso, KEVIN  06/30/2023

## 2023-08-18 ENCOUNTER — HOSPITAL ENCOUNTER (OUTPATIENT)
Dept: ULTRASOUND IMAGING | Facility: HOSPITAL | Age: 23
Discharge: HOME | End: 2023-08-18
Admitting: NURSE PRACTITIONER
Payer: COMMERCIAL

## 2023-08-18 PROCEDURE — 76830 TRANSVAGINAL US NON-OB: CPT

## 2023-08-21 DIAGNOSIS — R10.30 LOWER ABDOMINAL PAIN: Primary | ICD-10-CM

## 2023-08-21 DIAGNOSIS — N83.292 COMPLEX CYST OF LEFT OVARY: ICD-10-CM

## 2023-10-18 ENCOUNTER — OFFICE VISIT (OUTPATIENT)
Dept: OBSTETRICS AND GYNECOLOGY | Facility: CLINIC | Age: 23
End: 2023-10-18
Payer: COMMERCIAL

## 2023-10-18 VITALS
DIASTOLIC BLOOD PRESSURE: 64 MMHG | WEIGHT: 168 LBS | HEIGHT: 69 IN | SYSTOLIC BLOOD PRESSURE: 118 MMHG | BODY MASS INDEX: 24.88 KG/M2

## 2023-10-18 DIAGNOSIS — N83.202 LEFT OVARIAN CYST: Primary | ICD-10-CM

## 2023-10-18 DIAGNOSIS — Z30.8 ENCOUNTER FOR OTHER CONTRACEPTIVE MANAGEMENT: ICD-10-CM

## 2023-10-18 DIAGNOSIS — R10.11 RIGHT UPPER QUADRANT PAIN: ICD-10-CM

## 2023-10-18 PROCEDURE — 99204 OFFICE O/P NEW MOD 45 MIN: CPT | Performed by: OBSTETRICS & GYNECOLOGY

## 2023-10-19 NOTE — PROGRESS NOTES
Chief Complaint  Ovarian Cyst (Patient had ultrasound 23 showing left complex cyst. Patient advised pain has improved since previous scan. )     History of Present Illness:  Patient is 23 y.o.  who presents to [facility] here as a new patient for evaluation of a left ovarian cyst.  Patient reports she was having right upper quadrant pain.  She was seen by her primary care provider.  She had an ultrasound as noted.  She reports the pain has improved since that time.  She does have occasional twinges of pain but not as severe in nature.  She reports she has been having symptoms for the 6 months.  The patient had a cholecystectomy at the age of 7.  She does still have her appendix.  She denies any flank pain, fever, or chills.  She had previously been on oral contraceptives since high school.  She has now been off of them for the last 6 months.  She is currently using condoms for birth control.  Her last menstrual cycle was .  Her menstrual cycles have been heavy but regular in nature.  She desires to stay off of her oral contraceptives.    History  Past Medical History:   Diagnosis Date    Anemia     Anxiety     Gallstones     Iron deficiency     Ovarian cyst     Snores      Current Outpatient Medications on File Prior to Visit   Medication Sig Dispense Refill    dicyclomine (BENTYL) 10 MG capsule TAKE 1 CAPSULE BY MOUTH 4 (FOUR) TIMES A DAY BEFORE MEALS & AT BEDTIME. 360 capsule 1     No current facility-administered medications on file prior to visit.     Allergies   Allergen Reactions    Penicillins Rash     Past Surgical History:   Procedure Laterality Date    CHOLECYSTECTOMY      at age 7, + stones    WISDOM TOOTH EXTRACTION       Family History   Problem Relation Age of Onset    Migraines Mother     Osteoporosis Maternal Grandmother     Stroke Maternal Grandmother     Diabetes Maternal Grandfather     Heart attack Maternal Grandfather     Hypertension Maternal  "Grandfather     Stroke Paternal Grandmother     Diabetes Cousin     Hypertension Father     Colon cancer Neg Hx      Social History     Socioeconomic History    Marital status: Single   Tobacco Use    Smoking status: Never    Smokeless tobacco: Never   Vaping Use    Vaping Use: Never used   Substance and Sexual Activity    Alcohol use: Not Currently     Comment: social    Drug use: Never    Sexual activity: Yes     Partners: Male     Birth control/protection: Condom       Physical Examination:  Vital Signs: /64   Ht 175.3 cm (69\")   Wt 76.2 kg (168 lb)   BMI 24.81 kg/m²     General Appearance: alert, appears stated age, and cooperative  Breasts: Not performed.  Abdomen: no masses, no hepatomegaly, no splenomegaly, soft non-tender, no guarding, and no rebound tenderness  Pelvic: Not performed.    Data Review:  The following data was reviewed by: Eden Lozada MD on 10/18/2023:     Labs:  Comprehensive metabolic panel (07/11/2023 09:14)  Hemoglobin A1c (07/11/2023 09:14)  Iron and TIBC (07/11/2023 09:14)  Ferritin (07/11/2023 09:14)  Lipid panel (07/11/2023 09:14)  CBC w AUTO Differential (07/11/2023 09:14)  Vitamin B12 (07/11/2023 09:14)  TSH (07/11/2023 09:14)  T4, free (07/11/2023 09:14)  Bilirubin, Total & Direct (07/11/2023 09:14)  Imaging:  US Non-ob Transvaginal (08/18/2023 14:53) -images reviewed and agree with interpretation  US Non-ob Transvaginal (10/18/2023 15:26)  Medical Records:  None    Assessment and Plan   1. Right upper quadrant pain  Patient with right upper quadrant pain as noted.  She reports her pain has markedly improved.  A transvaginal ultrasound was obtained today.  Her ovarian cyst appears to be resolving.  Patient is instructed if continued and/or worsening pain she will need further evaluation with possible CT scan.    2. Left ovarian cyst  Patient with resolving left ovarian cyst.  This is the first time the patient has had an ovarian cyst that she is aware of.  She desires to " remain off of her oral contraceptives at present.  She is to call if worsening and/or changes in her symptoms.  Her transvaginal ultrasound has been reviewed and discussed as noted.    3. Encounter for other contraceptive management  Contraceptive counseling was provided.  The various options for contraception was discussed including natural family planning, withdrawal method, barrier methods, spermicides, oral contraception, transdermal patch, vaginal ring, injection, implant, and IUDs.  The risks, complications, failure rates, and benefits of each were discussed.     Follow Up/Instructions:  Follow up as noted.  Patient was given instructions and counseling regarding her condition or for health maintenance advice. Please see specific information pulled into the AVS if appropriate.     Note: Speech recognition transcription software may have been used to dictate portions of this document.  An attempt at proofreading has been made though minor errors in transcription may still be present.    This note was electronically signed.  Eden Lozada M.D.

## 2024-02-21 ENCOUNTER — OFFICE VISIT (OUTPATIENT)
Dept: INTERNAL MEDICINE | Facility: CLINIC | Age: 24
End: 2024-02-21
Payer: COMMERCIAL

## 2024-02-21 VITALS
DIASTOLIC BLOOD PRESSURE: 74 MMHG | SYSTOLIC BLOOD PRESSURE: 121 MMHG | WEIGHT: 172 LBS | OXYGEN SATURATION: 100 % | RESPIRATION RATE: 16 BRPM | TEMPERATURE: 97.6 F | HEART RATE: 81 BPM | BODY MASS INDEX: 25.48 KG/M2 | HEIGHT: 69 IN

## 2024-02-21 DIAGNOSIS — R19.7 DIARRHEA, UNSPECIFIED TYPE: ICD-10-CM

## 2024-02-21 DIAGNOSIS — R10.9 RIGHT FLANK PAIN: ICD-10-CM

## 2024-02-21 DIAGNOSIS — R17 ELEVATED BILIRUBIN: ICD-10-CM

## 2024-02-21 DIAGNOSIS — Z87.42 HISTORY OF OVARIAN CYST: ICD-10-CM

## 2024-02-21 DIAGNOSIS — R10.31 RIGHT LOWER QUADRANT PAIN: Primary | ICD-10-CM

## 2024-02-21 PROCEDURE — 99214 OFFICE O/P EST MOD 30 MIN: CPT | Performed by: NURSE PRACTITIONER

## 2024-02-21 NOTE — PROGRESS NOTES
Chief Complaint / Reason:      Chief Complaint   Patient presents with    Flank Pain     Right side, 8 months        Subjective     HPI  The patient is a 24-year-old female who presents today with complaints of right side pain which has been ongoing for 8 months. She had a transvaginal ultrasound in 10/2023, which showed the presence of multiple ovarian cyst.     She continues to have right sided pain. The patient initially had a cyst on her ovaries which caused her pain. Currently, her pain has worsened which has worsened and has become more constant in nature with occasional severe episodes. Her pain is localized to the mid section of her abdomen, and it radiates around. The pain is more in the back than in the front. The patient reports that it has been a while since she followed with her gastroenterologist. She recalls that during her previous gastroenterology visit, they suggested getting a colonoscopy; however, she notes that the procedure was not recommended to her but rather suggested. The patient denies any episodes of diarrhea or constipation, abdominal pain, or pelvic pain. She had pain when she went on vacation in 06/2023. She has tried heating pad and Tylenol, but it did not seem to help her. The patient had an initial transvaginal ultrasound done on 10/2023 which revealed several cysts but a repeat ultrasound revealed a rupture in one of the cysts. The patient denies any associated discharge. Of note, the patient has irritable bowel syndrome with diarrhea. The patient suffers from constipation occasionally, going 1 to 2 days without having a bowel movement. The patient does not believe her pain is worse when on her menstrual cycle. She cannot identify what exacerbates her pain. She reports having significant pain yesterday after starting her menstrual cycle.    She denies any chances of pregnancy. The patient has regular menses.  She made an appointment a couple of weeks ago, but she canceled it. The  patient denies feeling nauseous. She sleeps well and does not wake up in the middle of the night.    She underwent a cholecystectomy in the past.     She is sexually active. She does not take aspirin daily.     She denies any allergy to shellfish.     Answers submitted by the patient for this visit:  Primary Reason for Visit (Submitted on 2/20/2024)  What is the primary reason for your visit?: Extremity Pain  Lower Extremity Injury Questionnaire (Submitted on 2/20/2024)  Chief Complaint: Extremity pain  Injury: No    History taken from: patient    PMH/FH/Social History were reviewed and updated appropriately in the electronic medical record.   Past Medical History:   Diagnosis Date    Anemia     Anxiety     Gallstones     Iron deficiency     Ovarian cyst     Snores      Past Surgical History:   Procedure Laterality Date    CHOLECYSTECTOMY      at age 7, + stones    WISDOM TOOTH EXTRACTION  2015     Social History     Socioeconomic History    Marital status: Single   Tobacco Use    Smoking status: Never    Smokeless tobacco: Never   Vaping Use    Vaping status: Never Used   Substance and Sexual Activity    Alcohol use: Not Currently     Comment: social    Drug use: Never    Sexual activity: Yes     Partners: Male     Birth control/protection: Condom     Family History   Problem Relation Age of Onset    Migraines Mother     Osteoporosis Maternal Grandmother     Stroke Maternal Grandmother     Diabetes Maternal Grandfather     Heart attack Maternal Grandfather     Hypertension Maternal Grandfather     Stroke Paternal Grandmother     Diabetes Cousin     Hypertension Father     Colon cancer Neg Hx        Review of Systems:   Review of Systems      All other systems were reviewed and are negative.  Exceptions are noted in the subjective or above.      Objective     Vital Signs  Vitals:    02/21/24 1502   BP: 121/74   Pulse: 81   Resp: 16   Temp: 97.6 °F (36.4 °C)   SpO2: 100%       Body mass index is 25.4 kg/m².          Physical Exam  Vitals and nursing note reviewed.   Constitutional:       General: She is not in acute distress.     Appearance: She is well-developed.   Cardiovascular:      Rate and Rhythm: Normal rate and regular rhythm.      Pulses: Normal pulses.      Heart sounds: Normal heart sounds.   Pulmonary:      Effort: Pulmonary effort is normal.      Breath sounds: Normal breath sounds. No wheezing.   Chest:      Chest wall: No tenderness.   Abdominal:      General: Bowel sounds are normal.      Palpations: Abdomen is soft.      Tenderness: There is abdominal tenderness in the right lower quadrant, suprapubic area and left lower quadrant.   Skin:     General: Skin is warm and dry.      Capillary Refill: Capillary refill takes less than 2 seconds.      Coloration: Skin is not pale.      Findings: No erythema or rash.   Neurological:      Mental Status: She is alert and oriented to person, place, and time.   Psychiatric:         Behavior: Behavior normal.         Thought Content: Thought content normal.         Judgment: Judgment normal.              Results Review:    I reviewed the patient's new clinical results.     Transvaginal ultrasound from 10/2023 showed multiple ovarian cysts. There are bilateral small follicular cysts seen. There is a small area seen on the left ovarian consistent with collapsing ovarian cyst. There is a scant amount of free fluid.    Medication Review:   No current outpatient medications on file.    Diagnoses and all orders for this visit:    Right lower quadrant pain  -     CT Abdomen Pelvis With Contrast  -     Ambulatory Referral to Gastroenterology    Right flank pain  -     CT Abdomen Pelvis With Contrast  -     Ambulatory Referral to Gastroenterology    History of ovarian cyst  -     CT Abdomen Pelvis With Contrast    Elevated bilirubin  -     Ambulatory Referral to Gastroenterology    Diarrhea, unspecified type  -     Ambulatory Referral to Gastroenterology    1. Right upper  quadrant pain.  I will order a CT of the abdomen and pelvis. I will refer her to GI. She was advised to increase fiber in her diet and stay well hydrated. She can take Metamucil.    2. Irritable bowel syndrome with diarrhea.  Her bilirubin has been stable. She will schedule an annual physical.      The patient will follow up as needed.    KEVIN Rosado  02/21/2024    Scribed for KEVIN Rosado by Kasey Sparks 3/10/2024  12:09 EDT

## 2024-03-07 ENCOUNTER — HOSPITAL ENCOUNTER (OUTPATIENT)
Dept: CT IMAGING | Facility: HOSPITAL | Age: 24
Discharge: HOME | End: 2024-03-07
Admitting: NURSE PRACTITIONER
Payer: COMMERCIAL

## 2024-03-07 PROCEDURE — 25510000001 IOPAMIDOL 61 % SOLUTION: Performed by: NURSE PRACTITIONER

## 2024-03-07 PROCEDURE — 74177 CT ABD & PELVIS W/CONTRAST: CPT

## 2024-03-07 PROCEDURE — 0 DIATRIZOATE MEGLUMINE & SODIUM PER 1 ML: Performed by: NURSE PRACTITIONER

## 2024-03-07 RX ADMIN — DIATRIZOATE MEGLUMINE AND DIATRIZOATE SODIUM 15 ML: 660; 100 LIQUID ORAL; RECTAL at 16:25

## 2024-03-07 RX ADMIN — IOPAMIDOL 95 ML: 612 INJECTION, SOLUTION INTRAVENOUS at 16:25

## 2024-03-11 NOTE — PROGRESS NOTES
Patient notified of results and advised to follow-up with GI and GYN for further evaluation.  Mentioned possible cyst of kidney and discussed with patient.

## 2024-04-03 ENCOUNTER — OFFICE VISIT (OUTPATIENT)
Dept: GASTROENTEROLOGY | Facility: CLINIC | Age: 24
End: 2024-04-03
Payer: MEDICAID

## 2024-04-03 VITALS
RESPIRATION RATE: 16 BRPM | SYSTOLIC BLOOD PRESSURE: 118 MMHG | DIASTOLIC BLOOD PRESSURE: 84 MMHG | BODY MASS INDEX: 25.77 KG/M2 | OXYGEN SATURATION: 98 % | WEIGHT: 174 LBS | HEART RATE: 64 BPM | HEIGHT: 69 IN

## 2024-04-03 DIAGNOSIS — R17 ELEVATED BILIRUBIN: Chronic | ICD-10-CM

## 2024-04-03 DIAGNOSIS — R10.9 RIGHT SIDED ABDOMINAL PAIN: Primary | Chronic | ICD-10-CM

## 2024-04-03 DIAGNOSIS — K59.00 CONSTIPATION, UNSPECIFIED CONSTIPATION TYPE: Chronic | ICD-10-CM

## 2024-04-03 DIAGNOSIS — E80.4 GILBERT SYNDROME: Chronic | ICD-10-CM

## 2024-04-03 DIAGNOSIS — K58.2 IRRITABLE BOWEL SYNDROME WITH BOTH CONSTIPATION AND DIARRHEA: Chronic | ICD-10-CM

## 2024-04-03 DIAGNOSIS — R19.7 DIARRHEA, UNSPECIFIED TYPE: Chronic | ICD-10-CM

## 2024-04-03 PROBLEM — K58.0 IRRITABLE BOWEL SYNDROME WITH DIARRHEA: Status: ACTIVE | Noted: 2024-04-03

## 2024-04-03 PROCEDURE — 99214 OFFICE O/P EST MOD 30 MIN: CPT | Performed by: NURSE PRACTITIONER

## 2024-04-03 NOTE — PATIENT INSTRUCTIONS
High fiber, low fat diet with liberal water intake.   Low FODMAP diet - avoid all dairy. May use lactose free/dairy free alternatives such as almond milk, rice milk, oat milk, etc.   Metamucil 1 packet/scoop or or fiber gummies 2-4 per day.   May use Miralax 17 grams daily as needed for constipation.   May use stool softeners 2 per day as needed for constipation.   Labs  H. Pylori breath test  Follow up: 3 months      Low-FODMAP Eating Plan    FODMAP stands for fermentable oligosaccharides, disaccharides, monosaccharides, and polyols. These are sugars that are hard for some people to digest. A low-FODMAP eating plan may help some people who have irritable bowel syndrome (IBS) and certain other bowel (intestinal) diseases to manage their symptoms.  This meal plan can be complicated to follow. Work with a diet and nutrition specialist (dietitian) to make a low-FODMAP eating plan that is right for you. A dietitian can help make sure that you get enough nutrition from this diet.  What are tips for following this plan?  Reading food labels  Check labels for hidden FODMAPs such as:  High-fructose syrup.  Honey.  Agave.  Natural fruit flavors.  Onion or garlic powder.  Choose low-FODMAP foods that contain 3-4 grams of fiber per serving.  Check food labels for serving sizes. Eat only one serving at a time to make sure FODMAP levels stay low.  Shopping  Shop with a list of foods that are recommended on this diet and make a meal plan.  Meal planning  Follow a low-FODMAP eating plan for up to 6 weeks, or as told by your health care provider or dietitian.  To follow the eating plan:  Eliminate high-FODMAP foods from your diet completely. Choose only low-FODMAP foods to eat. You will do this for 2-6 weeks.  Gradually reintroduce high-FODMAP foods into your diet one at a time. Most people should wait a few days before introducing the next new high-FODMAP food into their meal plan. Your dietitian can recommend how quickly you may  reintroduce foods.  Keep a daily record of what and how much you eat and drink. Make note of any symptoms that you have after eating.  Review your daily record with a dietitian regularly to identify which foods you can eat and which foods you should avoid.  General tips  Drink enough fluid each day to keep your urine pale yellow.  Avoid processed foods. These often have added sugar and may be high in FODMAPs.  Avoid most dairy products, whole grains, and sweeteners.  Work with a dietitian to make sure you get enough fiber in your diet.  Avoid high FODMAP foods at meals to manage symptoms.    Recommended foods  Fruits  Bananas, oranges, tangerines, flako, limes, blueberries, raspberries, strawberries, grapes, cantaloupe, honeydew melon, kiwi, papaya, passion fruit, and pineapple. Limited amounts of dried cranberries, banana chips, and shredded coconut.  Vegetables  Eggplant, zucchini, cucumber, peppers, green beans, bean sprouts, lettuce, arugula, kale, Swiss chard, spinach, antoinette greens, bok malina, summer squash, potato, and tomato. Limited amounts of corn, carrot, and sweet potato. Green parts of scallions.  Grains  Gluten-free grains, such as rice, oats, buckwheat, quinoa, corn, polenta, and millet. Gluten-free pasta, bread, or cereal. Rice noodles. Corn tortillas.  Meats and other proteins  Unseasoned beef, pork, poultry, or fish. Eggs. Sharpe. Tofu (firm) and tempeh. Limited amounts of nuts and seeds, such as almonds, walnuts, brazil nuts, pecans, peanuts, nut butters, pumpkin seeds, daily seeds, and sunflower seeds.  Dairy  Lactose-free milk, yogurt, and kefir. Lactose-free cottage cheese and ice cream. Non-dairy milks, such as almond, coconut, hemp, and rice milk. Non-dairy yogurt. Limited amounts of goat cheese, brie, mozzarella, parmesan, swiss, and other hard cheeses.  Fats and oils  Butter-free spreads. Vegetable oils, such as olive, canola, and sunflower oil.  Seasoning and other foods  Artificial  "sweeteners with names that do not end in \"ol,\" such as aspartame, saccharine, and stevia. Maple syrup, white table sugar, raw sugar, brown sugar, and molasses. Mayonnaise, soy sauce, and tamari. Fresh basil, coriander, parsley, rosemary, and thyme.  Beverages  Water and mineral water. Sugar-sweetened soft drinks. Small amounts of orange juice or cranberry juice. Black and green tea. Most dry verna. Coffee.  The items listed above may not be a complete list of foods and beverages you can eat. Contact a dietitian for more information.    Foods to avoid  Fruits  Fresh, dried, and juiced forms of apple, pear, watermelon, peach, plum, cherries, apricots, blackberries, boysenberries, figs, nectarines, and abhishek. Avocado.  Vegetables  Chicory root, artichoke, asparagus, cabbage, snow peas, [place] sprouts, broccoli, sugar snap peas, mushrooms, celery, and cauliflower. Onions, garlic, leeks, and the white part of scallions.  Grains  Wheat, including kamut, durum, and semolina. Barley and bulgur. Couscous. Wheat-based cereals. Wheat noodles, bread, crackers, and pastries.  Meats and other proteins  Fried or fatty meat. Sausage. Cashews and pistachios. Soybeans, baked beans, black beans, chickpeas, kidney beans, luna beans, navy beans, lentils, black-eyed peas, and split peas.  Dairy  Milk, yogurt, ice cream, and soft cheese. Cream and sour cream. Milk-based sauces. Custard. Buttermilk. Soy milk.  Seasoning and other foods  Any sugar-free gum or candy. Foods that contain artificial sweeteners such as sorbitol, mannitol, isomalt, or xylitol. Foods that contain honey, high-fructose corn syrup, or agave. Bouillon, vegetable stock, beef stock, and chicken stock. Garlic and onion powder. Condiments made with onion, such as hummus, chutney, pickles, relish, salad dressing, and salsa. Tomato paste.  Beverages  Chicory-based drinks. Coffee substitutes. Chamomile tea. Fennel tea. Sweet or fortified verna such as port or amari. Diet " soft drinks made with isomalt, mannitol, maltitol, sorbitol, or xylitol. Apple, pear, and abhishek juice. Juices with high-fructose corn syrup.  The items listed above may not be a complete list of foods and beverages you should avoid. Contact a dietitian for more information.    Summary  FODMAP stands for fermentable oligosaccharides, disaccharides, monosaccharides, and polyols. These are sugars that are hard for some people to digest.  A low-FODMAP eating plan is a short-term diet that helps to ease symptoms of certain bowel diseases.  The eating plan usually lasts up to 6 weeks. After that, high-FODMAP foods are reintroduced gradually and one at a time. This can help you find out which foods may be causing symptoms.  A low-FODMAP eating plan can be complicated. It is best to work with a dietitian who has experience with this type of plan.  This information is not intended to replace advice given to you by your health care provider. Make sure you discuss any questions you have with your health care provider.  Document Revised: 05/06/2021 Document Reviewed: 05/06/2021  [employer]vier Patient Education © 2023 [employer]

## 2024-04-03 NOTE — PROGRESS NOTES
Follow Up Note     Date: 2024   Patient Name: Araseli Martins  MRN: [id number]  : 2000     Primary Care Provider: Karlene Monterroso APRN     Chief Complaint   Patient presents with    Abdominal Pain     History of present illness:   4/3/2024  Araseli Martins is a 24 y.o. female who is here today for follow up for abdominal pain. She has been having right side pain for the past year or so. It was constant for 3 months, but now comes and goes. She has not had the pain at all for the past month. She has not found anything that makes the pain better or worse. No nausea or vomiting. Denies reflux or difficulty swallowing. She has occasional constipation and can go 3 days without a bowel movement at times. She hasn't had any diarrhea episodes in a while. Denies overt GI bleeding, she had a trace amount of blood on the tissue about a month ago, but none since.     Interval History:  2021  Araseli Martins is a 21 y.o. female who is here today for follow up for nausea.  She has been feeling somewhat better. She has had about 5 episodes of diarrhea since she has cut out dairy and gluten. She had diarrhea after eating at [employer] and again after eating eggs and evans. No history of rectal bleeding.  No nausea or vomiting. She is taking the Bentyl 4 times per day and it has helped.      11/10/2021  She has nausea after eating, then abdominal cramping then diarrhea. This occurs about 50% of the time with fecal urgency. She has 1-2 BMs per day and typically formed other than with her episodes. She has been having these episodes for about 5 years now, remained the same. Sometimes does have bright red blood on the tissue after a bowel movement, has had an anal fissure in the past diagnosed by her PCP. No current rectal pain. She has tried dicyclomine for relief of abdominal pain, has not seemed to help yet. Has had elevated bilirubin on her labs for years now. No prior EGD or colonoscopy. There is no  known family history of colon cancer or colon polyps. Had recent labs with PCP recently which were all normal except for elevated bilirubin. No tobacco or marijuana use. Drinks alcohol socially in small amounts, no daily use. Had imaging of her liver in 2018 which was normal, no abdominal imaging since then.     Subjective      Past Medical History:   Diagnosis Date    Anemia     Anxiety     Gallstones     Iron deficiency     Ovarian cyst     Snores      Past Surgical History:   Procedure Laterality Date    CHOLECYSTECTOMY      at age 7, + stones    WISDOM TOOTH EXTRACTION  2015     Family History   Problem Relation Age of Onset    Migraines Mother     Osteoporosis Maternal Grandmother     Stroke Maternal Grandmother     Diabetes Maternal Grandfather     Heart attack Maternal Grandfather     Hypertension Maternal Grandfather     Stroke Paternal Grandmother     Diabetes Cousin     Hypertension Father     Colon cancer Neg Hx      Social History     Socioeconomic History    Marital status: Single   Tobacco Use    Smoking status: Never    Smokeless tobacco: Never   Vaping Use    Vaping status: Never Used   Substance and Sexual Activity    Alcohol use: Not Currently     Comment: social    Drug use: Never    Sexual activity: Yes     Partners: Male     Birth control/protection: Condom     No current outpatient medications on file.  Allergies   Allergen Reactions    Penicillins Rash     The following portions of the patient's history were reviewed and updated as appropriate: allergies, current medications, past family history, past medical history, past social history, past surgical history and problem list.  Objective     Physical Exam  Vitals and nursing note reviewed.   Constitutional:       General: She is not in acute distress.     Appearance: Normal appearance. She is well-developed.   HENT:      Head: Normocephalic and atraumatic.      Mouth/Throat:      Mouth: Mucous membranes are not pale, not dry and not cyanotic.  "  Eyes:      General: Lids are normal.   Neck:      Trachea: Trachea normal.   Cardiovascular:      Rate and Rhythm: Normal rate.   Pulmonary:      Effort: Pulmonary effort is normal. No respiratory distress.      Breath sounds: Normal breath sounds.   Abdominal:      General: Bowel sounds are normal.      Palpations: Abdomen is soft.      Tenderness: There is no abdominal tenderness.       Skin:     General: Skin is warm and dry.   Neurological:      Mental Status: She is alert and oriented to person, place, and time.   Psychiatric:         Mood and Affect: Mood normal.         Speech: Speech normal.         Behavior: Behavior normal. Behavior is cooperative.       Vitals:    04/03/24 1608   BP: 118/84   Pulse: 64   Resp: 16   SpO2: 98%   Weight: 78.9 kg (174 lb)   Height: 175.3 cm (69\")     Body mass index is 25.7 kg/m².     Results Review:   I reviewed the patient's new clinical results.    No visits with results within 90 Day(s) from this visit.   Latest known visit with results is:   Office Visit on 06/30/2023   Component Date Value Ref Range Status    Color 06/30/2023 Yellow  Yellow, Straw, Dark Yellow, Michelle Final    Clarity, UA 06/30/2023 Clear  Clear Final    Specific Gravity  06/30/2023 1.015  1.005 - 1.030 Final    pH, Urine 06/30/2023 6.5  5.0 - 8.0 Final    Leukocytes 06/30/2023 Negative  Negative Final    Nitrite, UA 06/30/2023 Negative  Negative Final    Protein, POC 06/30/2023 Negative  Negative mg/dL Final    Glucose, UA 06/30/2023 Negative  Negative mg/dL Final    Ketones, UA 06/30/2023 Negative  Negative Final    Urobilinogen, UA 06/30/2023 Normal  Normal, 0.2 E.U./dL Final    Bilirubin 06/30/2023 Negative  Negative Final    Blood, UA 06/30/2023 Negative  Negative Final    Lot Number 06/30/2023 [id number]   Final    Expiration Date 06/30/2023 3/25/24   Final      Comprehensive metabolic panel (07/11/2023 09:14)  CBC w AUTO Differential (07/11/2023 09:14)  Iron and TIBC (07/11/2023 " 09:14)  Ferritin (07/11/2023 09:14)  Vitamin B12 (07/11/2023 09:14)  TSH (07/11/2023 09:14)  Bilirubin, Total & Direct (07/11/2023 09:14)    CT Abdomen Pelvis With Contrast     Result Date: 3/7/2024  Impression: 1.No acute abnormality identified within the abdomen or pelvis. 2.Normal appendix. 3.Moderate colonic stool. 4.3.2 cm left adnexal cyst. Mild pelvic free fluid, possibly physiologic. 5.Additional findings as detailed above.    The liver is unremarkable in morphology. No focal liver lesion is seen. No biliary dilation is seen.     Assessment / Plan      1. Right sided abdominal pain  2. Constipation, unspecified constipation type  3. Diarrhea, unspecified type  4. Irritable bowel syndrome with diarrhea and constipation  She had had right side abdominal pain starting a year ago, the pain gradually improved and she has not had any episodes of pain in the past month.  No history of nausea or vomiting.  No reflux or difficulty swallowing.  She has a history of diarrhea in the past, currently having some constipation off-and-on.  Denies any overt GI bleeding.  Labs dated 7/11/2023 with CBC unremarkable, iron panel normal, ferritin normal, B12 normal, TSH normal.  Previous celiac panel normal.  CMP with total bilirubin elevated at 2.8, normal AST/ALT/alkaline phosphatase.  CTAP dated 3/7/2024 with moderate colonic stool, otherwise unremarkable.  Etiology of pain unclear.  Suspect IBS at baseline.  Low FODMAP diet-avoid all dairy.  Metamucil or fiber Gummies daily.  May use MiraLAX or stool softeners as needed for constipation.  Labs  H. pylori breath test    - H. Pylori Breath Test - Breath, Lung; Future    5. Elevated bilirubin  6. Gilbert's syndrome - Suspected  She has a history of elevated bilirubin for several years.  No history of elevated AST, ALT or alkaline phosphatase.  Direct bilirubin borderline elevated at 0.4 on labs dated 7/11/2023, though same as when checked on 4/19/2018.  Direct bilirubin has been  normal in the past several times as well.  No history of IVDA, alcohol use, tattoos, blood transfusions or family history of liver disease.  CTAP dated 3/7/2024 with liver unremarkable, no lesions.  Likely Gilbert's syndrome.  Labs    - CBC (No Diff); Future  - Comprehensive Metabolic Panel; Future  - Protime-INR; Future  - Hepatitis A Antibody, Total; Future  - Hepatitis B Surface Antibody; Future  - Hepatitis B Surface Antigen; Future  - Hepatitis B Core Antibody, Total; Future  - Hepatitis C Antibody; Future  - CARLYN; Future  - Anti-Smooth Muscle Antibody Titer; Future  - Mitochondrial Antibodies, M2; Future    Patient Instructions   High fiber, low fat diet with liberal water intake.   Low FODMAP diet - avoid all dairy. May use lactose free/dairy free alternatives such as almond milk, rice milk, oat milk, etc.   Metamucil 1 packet/scoop or or fiber gummies 2-4 per day.   May use Miralax 17 grams daily as needed for constipation.   May use stool softeners 2 per day as needed for constipation.   Labs  H. Pylori breath test  Follow up: 3 months      Low-FODMAP Eating Plan    FODMAP stands for fermentable oligosaccharides, disaccharides, monosaccharides, and polyols. These are sugars that are hard for some people to digest. A low-FODMAP eating plan may help some people who have irritable bowel syndrome (IBS) and certain other bowel (intestinal) diseases to manage their symptoms.  This meal plan can be complicated to follow. Work with a diet and nutrition specialist (dietitian) to make a low-FODMAP eating plan that is right for you. A dietitian can help make sure that you get enough nutrition from this diet.  What are tips for following this plan?  Reading food labels  Check labels for hidden FODMAPs such as:  High-fructose syrup.  Honey.  Agave.  Natural fruit flavors.  Onion or garlic powder.  Choose low-FODMAP foods that contain 3-4 grams of fiber per serving.  Check food labels for serving sizes. Eat only one  serving at a time to make sure FODMAP levels stay low.  Shopping  Shop with a list of foods that are recommended on this diet and make a meal plan.  Meal planning  Follow a low-FODMAP eating plan for up to 6 weeks, or as told by your health care provider or dietitian.  To follow the eating plan:  Eliminate high-FODMAP foods from your diet completely. Choose only low-FODMAP foods to eat. You will do this for 2-6 weeks.  Gradually reintroduce high-FODMAP foods into your diet one at a time. Most people should wait a few days before introducing the next new high-FODMAP food into their meal plan. Your dietitian can recommend how quickly you may reintroduce foods.  Keep a daily record of what and how much you eat and drink. Make note of any symptoms that you have after eating.  Review your daily record with a dietitian regularly to identify which foods you can eat and which foods you should avoid.  General tips  Drink enough fluid each day to keep your urine pale yellow.  Avoid processed foods. These often have added sugar and may be high in FODMAPs.  Avoid most dairy products, whole grains, and sweeteners.  Work with a dietitian to make sure you get enough fiber in your diet.  Avoid high FODMAP foods at meals to manage symptoms.    Recommended foods  Fruits  Bananas, oranges, tangerines, flako, limes, blueberries, raspberries, strawberries, grapes, cantaloupe, honeydew melon, kiwi, papaya, passion fruit, and pineapple. Limited amounts of dried cranberries, banana chips, and shredded coconut.  Vegetables  Eggplant, zucchini, cucumber, peppers, green beans, bean sprouts, lettuce, arugula, kale, Swiss chard, spinach, antoinette greens, bok malina, summer squash, potato, and tomato. Limited amounts of corn, carrot, and sweet potato. Green parts of scallions.  Grains  Gluten-free grains, such as rice, oats, buckwheat, quinoa, corn, polenta, and millet. Gluten-free pasta, bread, or cereal. Rice noodles. Corn tortillas.  Meats and  "other proteins  Unseasoned beef, pork, poultry, or fish. Eggs. Sharpe. Tofu (firm) and tempeh. Limited amounts of nuts and seeds, such as almonds, walnuts, brazil nuts, pecans, peanuts, nut butters, pumpkin seeds, daily seeds, and sunflower seeds.  Dairy  Lactose-free milk, yogurt, and kefir. Lactose-free cottage cheese and ice cream. Non-dairy milks, such as almond, coconut, hemp, and rice milk. Non-dairy yogurt. Limited amounts of goat cheese, brie, mozzarella, parmesan, swiss, and other hard cheeses.  Fats and oils  Butter-free spreads. Vegetable oils, such as olive, canola, and sunflower oil.  Seasoning and other foods  Artificial sweeteners with names that do not end in \"ol,\" such as aspartame, saccharine, and stevia. Maple syrup, white table sugar, raw sugar, brown sugar, and molasses. Mayonnaise, soy sauce, and tamari. Fresh basil, coriander, parsley, rosemary, and thyme.  Beverages  Water and mineral water. Sugar-sweetened soft drinks. Small amounts of orange juice or cranberry juice. Black and green tea. Most dry verna. Coffee.  The items listed above may not be a complete list of foods and beverages you can eat. Contact a dietitian for more information.    Foods to avoid  Fruits  Fresh, dried, and juiced forms of apple, pear, watermelon, peach, plum, cherries, apricots, blackberries, boysenberries, figs, nectarines, and abhishek. Avocado.  Vegetables  Chicory root, artichoke, asparagus, cabbage, snow peas, [place] sprouts, broccoli, sugar snap peas, mushrooms, celery, and cauliflower. Onions, garlic, leeks, and the white part of scallions.  Grains  Wheat, including kamut, durum, and semolina. Barley and bulgur. Couscous. Wheat-based cereals. Wheat noodles, bread, crackers, and pastries.  Meats and other proteins  Fried or fatty meat. Sausage. Cashews and pistachios. Soybeans, baked beans, black beans, chickpeas, kidney beans, luna beans, navy beans, lentils, black-eyed peas, and split peas.  Dairy  Milk, " yogurt, ice cream, and soft cheese. Cream and sour cream. Milk-based sauces. Custard. Buttermilk. Soy milk.  Seasoning and other foods  Any sugar-free gum or candy. Foods that contain artificial sweeteners such as sorbitol, mannitol, isomalt, or xylitol. Foods that contain honey, high-fructose corn syrup, or agave. Bouillon, vegetable stock, beef stock, and chicken stock. Garlic and onion powder. Condiments made with onion, such as hummus, chutney, pickles, relish, salad dressing, and salsa. Tomato paste.  Beverages  Chicory-based drinks. Coffee substitutes. Chamomile tea. Fennel tea. Sweet or fortified verna such as port or amari. Diet soft drinks made with isomalt, mannitol, maltitol, sorbitol, or xylitol. Apple, pear, and abhishek juice. Juices with high-fructose corn syrup.  The items listed above may not be a complete list of foods and beverages you should avoid. Contact a dietitian for more information.    Summary  FODMAP stands for fermentable oligosaccharides, disaccharides, monosaccharides, and polyols. These are sugars that are hard for some people to digest.  A low-FODMAP eating plan is a short-term diet that helps to ease symptoms of certain bowel diseases.  The eating plan usually lasts up to 6 weeks. After that, high-FODMAP foods are reintroduced gradually and one at a time. This can help you find out which foods may be causing symptoms.  A low-FODMAP eating plan can be complicated. It is best to work with a dietitian who has experience with this type of plan.  This information is not intended to replace advice given to you by your health care provider. Make sure you discuss any questions you have with your health care provider.  Document Revised: 05/06/2021 Document Reviewed: 05/06/2021  Elsevier Patient Education © 2023 [employer]    KEVIN Bryant  4/3/2024    Please note that portions of this note were completed with a voice recognition program.

## 2024-04-18 ENCOUNTER — LAB (OUTPATIENT)
Dept: LAB | Facility: HOSPITAL | Age: 24
End: 2024-04-18
Payer: COMMERCIAL

## 2024-04-18 DIAGNOSIS — R17 ELEVATED BILIRUBIN: Chronic | ICD-10-CM

## 2024-04-18 DIAGNOSIS — R10.9 RIGHT SIDED ABDOMINAL PAIN: Chronic | ICD-10-CM

## 2024-04-18 LAB
ALBUMIN SERPL-MCNC: 4.9 G/DL (ref 3.5–5.2)
ALBUMIN/GLOB SERPL: 2 G/DL
ALP SERPL-CCNC: 61 U/L (ref 39–117)
ALT SERPL W P-5'-P-CCNC: 16 U/L (ref 1–33)
ANION GAP SERPL CALCULATED.3IONS-SCNC: 12 MMOL/L (ref 5–15)
AST SERPL-CCNC: 16 U/L (ref 1–32)
BILIRUB CONJ SERPL-MCNC: 0.4 MG/DL (ref 0–0.3)
BILIRUB SERPL-MCNC: 2.1 MG/DL (ref 0–1.2)
BUN SERPL-MCNC: 12 MG/DL (ref 6–20)
BUN/CREAT SERPL: 16.7 (ref 7–25)
CALCIUM SPEC-SCNC: 9.5 MG/DL (ref 8.6–10.5)
CHLORIDE SERPL-SCNC: 104 MMOL/L (ref 98–107)
CO2 SERPL-SCNC: 22 MMOL/L (ref 22–29)
CREAT SERPL-MCNC: 0.72 MG/DL (ref 0.57–1)
DEPRECATED RDW RBC AUTO: 37.9 FL (ref 37–54)
EGFRCR SERPLBLD CKD-EPI 2021: 119.2 ML/MIN/1.73
ERYTHROCYTE [DISTWIDTH] IN BLOOD BY AUTOMATED COUNT: 11.4 % (ref 12.3–15.4)
GLOBULIN UR ELPH-MCNC: 2.4 GM/DL
GLUCOSE SERPL-MCNC: 85 MG/DL (ref 65–99)
HBV SURFACE AB SER RIA-ACNC: NORMAL
HBV SURFACE AG SERPL QL IA: NORMAL
HCT VFR BLD AUTO: 35 % (ref 34–46.6)
HCV AB SER QL: NORMAL
HGB BLD-MCNC: 11.7 G/DL (ref 12–15.9)
INR PPP: 1.06 (ref 0.9–1.1)
MCH RBC QN AUTO: 31.4 PG (ref 26.6–33)
MCHC RBC AUTO-ENTMCNC: 33.4 G/DL (ref 31.5–35.7)
MCV RBC AUTO: 93.8 FL (ref 79–97)
PLATELET # BLD AUTO: 259 10*3/MM3 (ref 140–450)
PMV BLD AUTO: 11.4 FL (ref 6–12)
POTASSIUM SERPL-SCNC: 3.8 MMOL/L (ref 3.5–5.2)
PROT SERPL-MCNC: 7.3 G/DL (ref 6–8.5)
PROTHROMBIN TIME: 14.3 SECONDS (ref 12.3–15.1)
RBC # BLD AUTO: 3.73 10*6/MM3 (ref 3.77–5.28)
SODIUM SERPL-SCNC: 138 MMOL/L (ref 136–145)
WBC NRBC COR # BLD AUTO: 7.59 10*3/MM3 (ref 3.4–10.8)

## 2024-04-18 PROCEDURE — 87340 HEPATITIS B SURFACE AG IA: CPT

## 2024-04-18 PROCEDURE — 86381 MITOCHONDRIAL ANTIBODY EACH: CPT

## 2024-04-18 PROCEDURE — 36415 COLL VENOUS BLD VENIPUNCTURE: CPT

## 2024-04-18 PROCEDURE — 85610 PROTHROMBIN TIME: CPT

## 2024-04-18 PROCEDURE — 85027 COMPLETE CBC AUTOMATED: CPT

## 2024-04-18 PROCEDURE — 86704 HEP B CORE ANTIBODY TOTAL: CPT

## 2024-04-18 PROCEDURE — 80053 COMPREHEN METABOLIC PANEL: CPT

## 2024-04-18 PROCEDURE — 86708 HEPATITIS A ANTIBODY: CPT

## 2024-04-18 PROCEDURE — 86706 HEP B SURFACE ANTIBODY: CPT

## 2024-04-18 PROCEDURE — 86015 ACTIN ANTIBODY EACH: CPT

## 2024-04-18 PROCEDURE — 83013 H PYLORI (C-13) BREATH: CPT

## 2024-04-18 PROCEDURE — 86803 HEPATITIS C AB TEST: CPT

## 2024-04-18 PROCEDURE — 82248 BILIRUBIN DIRECT: CPT

## 2024-04-18 PROCEDURE — 86038 ANTINUCLEAR ANTIBODIES: CPT

## 2024-04-20 LAB
HAV AB SER QL IA: POSITIVE
HBV CORE AB SERPL QL IA: NEGATIVE
MITOCHONDRIA M2 IGG SER-ACNC: <20 UNITS (ref 0–20)
SMA IGG SER-ACNC: 9 UNITS (ref 0–19)

## 2024-04-22 LAB
ANA SER QL: NEGATIVE
UREA BREATH TEST QL: NEGATIVE

## 2024-05-15 ENCOUNTER — OFFICE VISIT (OUTPATIENT)
Dept: OBSTETRICS AND GYNECOLOGY | Facility: CLINIC | Age: 24
End: 2024-05-15
Payer: COMMERCIAL

## 2024-05-15 VITALS
WEIGHT: 172.2 LBS | DIASTOLIC BLOOD PRESSURE: 64 MMHG | HEIGHT: 69 IN | SYSTOLIC BLOOD PRESSURE: 110 MMHG | BODY MASS INDEX: 25.51 KG/M2

## 2024-05-15 DIAGNOSIS — R10.11 RIGHT UPPER QUADRANT PAIN: ICD-10-CM

## 2024-05-15 DIAGNOSIS — N83.202 LEFT OVARIAN CYST: Primary | ICD-10-CM

## 2024-05-15 DIAGNOSIS — N28.1 RENAL CYST: ICD-10-CM

## 2024-05-15 PROCEDURE — 99214 OFFICE O/P EST MOD 30 MIN: CPT | Performed by: OBSTETRICS & GYNECOLOGY

## 2024-05-15 NOTE — PROGRESS NOTES
Chief Complaint  Follow-up (TVS done today to evaluate Ovarian cyst.)     History of Present Illness:  Patient is 24 y.o.  who presents to [facility] here for follow-up evaluation of left ovarian cyst.  Patient has been seen in the past and was noted to have a large left ovarian cyst.  She did have follow-up with resolution.  She has continued to have right upper quadrant to mid quadrant pain.  She was seen by her primary care provider.  She did have a CT scan of her abdomen and pelvis in March.  This did show another left ovarian cyst.  She denies any left lower quadrant pain.  She continues to have the right mid to upper quadrant pain.  She is also being evaluated by GI.  She did have a liver ultrasound many years ago secondary to her elevated bilirubin.  Her right kidney was normal at that time.    History  Past Medical History:   Diagnosis Date    Anemia     Anxiety     Gallstones     Iron deficiency     Ovarian cyst     Snores      No current outpatient medications on file prior to visit.     No current facility-administered medications on file prior to visit.     Allergies   Allergen Reactions    Penicillins Rash     Past Surgical History:   Procedure Laterality Date    CHOLECYSTECTOMY      at age 7, + stones    WISDOM TOOTH EXTRACTION       Family History   Problem Relation Age of Onset    Migraines Mother     Osteoporosis Maternal Grandmother     Stroke Maternal Grandmother     Diabetes Maternal Grandfather     Heart attack Maternal Grandfather     Hypertension Maternal Grandfather     Stroke Paternal Grandmother     Diabetes Cousin     Hypertension Father     Colon cancer Neg Hx      Social History     Socioeconomic History    Marital status: Single   Tobacco Use    Smoking status: Never    Smokeless tobacco: Never   Vaping Use    Vaping status: Never Used   Substance and Sexual Activity    Alcohol use: Not Currently     Comment: social    Drug use: Never    Sexual  "activity: Yes     Partners: Male     Birth control/protection: Condom       Physical Examination:  Vital Signs: /64   Ht 175.3 cm (69\")   Wt 78.1 kg (172 lb 3.2 oz)   BMI 25.43 kg/m²     General Appearance: alert, appears stated age, and cooperative  Breasts: Not performed.  Abdomen: no masses, no hepatomegaly, no splenomegaly, soft non-tender, no guarding, and no rebound tenderness  Pelvic: Not performed.    Data Review:  The following data was reviewed by: Eden Lozada MD on 05/15/2024:     Labs:  Bilirubin, Direct (04/18/2024 15:35)  CBC (No Diff) (04/18/2024 15:35)  Comprehensive Metabolic Panel (04/18/2024 15:35)  H. Pylori Breath Test - Breath, Lung (04/18/2024 15:35)  Protime-INR (04/18/2024 15:35)  Hepatitis A Antibody, Total (04/18/2024 15:35)  Hepatitis B Surface Antibody (04/18/2024 15:35)  Hepatitis B Surface Antigen (04/18/2024 15:35)  Hepatitis B Core Antibody, Total (04/18/2024 15:35)  Hepatitis C Antibody (04/18/2024 15:35)  CARLYN (04/18/2024 15:35)  Anti-Smooth Muscle Antibody Titer (04/18/2024 15:35)  Mitochondrial Antibodies, M2 (04/18/2024 15:35)  Imaging:  CT Abdomen Pelvis With Contrast (03/07/2024 16:24)  US Non-ob Transvaginal (05/15/2024 10:30)  US Liver (04/24/2018 13:27)   Medical Records:  None    Assessment and Plan   1. Renal cyst  Renal ultrasound as noted.  Plan pending results.  - US Renal Bilateral; Future    2. Right upper quadrant pain  Patient with continued right upper quadrant pain.  She is to keep her follow-up appointment with GI as well as her primary care provider as discussed.    3. Left ovarian cyst  Patient with known left ovarian cyst.  Transvaginal ultrasound was obtained today.  Patient has been informed regarding those findings.  Patient informs she is forming frequent functional cyst.  Instructions and precautions have been given.  She is to follow-up should she become symptomatic as discussed.    Follow Up/Instructions:  Follow up as noted.  Patient was " given instructions and counseling regarding her condition or for health maintenance advice. Please see specific information pulled into the AVS if appropriate.     Note: Speech recognition transcription software may have been used to dictate portions of this document.  An attempt at proofreading has been made though minor errors in transcription may still be present.    This note was electronically signed.  Eden Lozada M.D.

## 2024-07-10 ENCOUNTER — OFFICE VISIT (OUTPATIENT)
Dept: GASTROENTEROLOGY | Facility: CLINIC | Age: 24
End: 2024-07-10
Payer: COMMERCIAL

## 2024-07-10 VITALS
OXYGEN SATURATION: 95 % | DIASTOLIC BLOOD PRESSURE: 80 MMHG | BODY MASS INDEX: 24.51 KG/M2 | WEIGHT: 166 LBS | HEART RATE: 68 BPM | SYSTOLIC BLOOD PRESSURE: 110 MMHG

## 2024-07-10 DIAGNOSIS — K58.2 IRRITABLE BOWEL SYNDROME WITH BOTH CONSTIPATION AND DIARRHEA: Chronic | ICD-10-CM

## 2024-07-10 DIAGNOSIS — E80.4 GILBERT SYNDROME: Chronic | ICD-10-CM

## 2024-07-10 DIAGNOSIS — R19.7 DIARRHEA, UNSPECIFIED TYPE: Chronic | ICD-10-CM

## 2024-07-10 DIAGNOSIS — R10.9 RIGHT SIDED ABDOMINAL PAIN: Primary | Chronic | ICD-10-CM

## 2024-07-10 DIAGNOSIS — R17 ELEVATED BILIRUBIN: Chronic | ICD-10-CM

## 2024-07-10 DIAGNOSIS — K59.00 CONSTIPATION, UNSPECIFIED CONSTIPATION TYPE: Chronic | ICD-10-CM

## 2024-07-10 DIAGNOSIS — D64.9 ANEMIA, UNSPECIFIED TYPE: Chronic | ICD-10-CM

## 2024-07-10 PROCEDURE — 99214 OFFICE O/P EST MOD 30 MIN: CPT | Performed by: NURSE PRACTITIONER

## 2024-07-10 RX ORDER — SODIUM, POTASSIUM,MAG SULFATES 17.5-3.13G
SOLUTION, RECONSTITUTED, ORAL ORAL
Qty: 177 ML | Refills: 0 | Status: SHIPPED | OUTPATIENT
Start: 2024-07-10

## 2024-07-10 RX ORDER — BISACODYL 5 MG/1
TABLET, DELAYED RELEASE ORAL
Qty: 4 TABLET | Refills: 0 | Status: SHIPPED | OUTPATIENT
Start: 2024-07-10

## 2024-07-10 RX ORDER — SODIUM CHLORIDE 9 MG/ML
70 INJECTION, SOLUTION INTRAVENOUS CONTINUOUS PRN
OUTPATIENT
Start: 2024-07-10

## 2024-07-10 NOTE — PROGRESS NOTES
Follow Up Note     Date: 07/10/2024   Patient Name: Araseli Martins  MRN: [id number]  : 2000     Primary Care Provider: Karlene Monterroso APRN     Chief Complaint   Patient presents with    Abdominal Pain     History of present illness:   7/10/2024  Araseli Martins is a 24 y.o. female who is here today for follow up for abdominal pain. She has continued to have right sided pain that has not changed. The pain is more on the lower right side. Bowel habits unchanged, more constipation than diarrhea. Denies nausea or vomiting. Denies reflux or difficulty swallowing.  Denies heavy menstrual cycles. Denies hematuria.     Interval History:  4/3/2024  Araseli Martins is a 24 y.o. female who is here today for follow up for abdominal pain. She has been having right side pain for the past year or so. It was constant for 3 months, but now comes and goes. She has not had the pain at all for the past month. She has not found anything that makes the pain better or worse. No nausea or vomiting. Denies reflux or difficulty swallowing. She has occasional constipation and can go 3 days without a bowel movement at times. She hasn't had any diarrhea episodes in a while. Denies overt GI bleeding, she had a trace amount of blood on the tissue about a month ago, but none since.     11/10/2021  She has nausea after eating, then abdominal cramping then diarrhea. This occurs about 50% of the time with fecal urgency. She has 1-2 BMs per day and typically formed other than with her episodes. She has been having these episodes for about 5 years now, remained the same. Sometimes does have bright red blood on the tissue after a bowel movement, has had an anal fissure in the past diagnosed by her PCP. No current rectal pain. She has tried dicyclomine for relief of abdominal pain, has not seemed to help yet. Has had elevated bilirubin on her labs for years now. No prior EGD or colonoscopy. There is no known family history of colon  cancer or colon polyps. Had recent labs with PCP recently which were all normal except for elevated bilirubin. No tobacco or marijuana use. Drinks alcohol socially in small amounts, no daily use. Had imaging of her liver in 2018 which was normal, no abdominal imaging since then.     Subjective      Past Medical History:   Diagnosis Date    Anemia     Anxiety     Gallstones     Iron deficiency     Ovarian cyst     Snores      Past Surgical History:   Procedure Laterality Date    CHOLECYSTECTOMY      at age 7, + stones    WISDOM TOOTH EXTRACTION  2015     Family History   Problem Relation Age of Onset    Migraines Mother     Osteoporosis Maternal Grandmother     Stroke Maternal Grandmother     Diabetes Maternal Grandfather     Heart attack Maternal Grandfather     Hypertension Maternal Grandfather     Stroke Paternal Grandmother     Diabetes Cousin     Hypertension Father     Colon cancer Neg Hx      Social History     Socioeconomic History    Marital status: Single   Tobacco Use    Smoking status: Never    Smokeless tobacco: Never   Vaping Use    Vaping status: Never Used   Substance and Sexual Activity    Alcohol use: Not Currently     Comment: social    Drug use: Never    Sexual activity: Yes     Partners: Male     Birth control/protection: Condom       Current Outpatient Medications:     bisacodyl (DULCOLAX) 5 MG EC tablet, Take as directed for colon prep, Disp: 4 tablet, Rfl: 0    sodium-potassium-magnesium sulfates (Suprep Bowel Prep Kit) 17.5-3.13-1.6 GM/177ML solution oral solution, Use as directed for colonoscopy prep. Patient has instructions., Disp: 177 mL, Rfl: 0  Allergies   Allergen Reactions    Penicillins Rash     The following portions of the patient's history were reviewed and updated as appropriate: allergies, current medications, past family history, past medical history, past social history, past surgical history and problem list.  Objective     Physical Exam  Vitals and nursing note reviewed.    Constitutional:       General: She is not in acute distress.     Appearance: Normal appearance. She is well-developed.   HENT:      Head: Normocephalic and atraumatic.      Mouth/Throat:      Mouth: Mucous membranes are not pale, not dry and not cyanotic.   Eyes:      General: Lids are normal.   Neck:      Trachea: Trachea normal.   Cardiovascular:      Rate and Rhythm: Normal rate.   Pulmonary:      Effort: Pulmonary effort is normal. No respiratory distress.      Breath sounds: Normal breath sounds.   Abdominal:      General: Bowel sounds are normal.      Palpations: Abdomen is soft.      Tenderness: There is no abdominal tenderness.   Skin:     General: Skin is warm and dry.   Neurological:      Mental Status: She is alert and oriented to person, place, and time.   Psychiatric:         Mood and Affect: Mood normal.         Speech: Speech normal.         Behavior: Behavior normal. Behavior is cooperative.       Vitals:    07/10/24 1100   BP: 110/80   Pulse: 68   SpO2: 95%   Weight: 75.3 kg (166 lb)     Body mass index is 24.51 kg/m².     Results Review:   I reviewed the patient's new clinical results.    Lab on 04/18/2024   Component Date Value Ref Range Status    Bilirubin, Direct 04/18/2024 0.4 (H)  0.0 - 0.3 mg/dL Final    WBC 04/18/2024 7.59  3.40 - 10.80 10*3/mm3 Final    RBC 04/18/2024 3.73 (L)  3.77 - 5.28 10*6/mm3 Final    Hemoglobin 04/18/2024 11.7 (L)  12.0 - 15.9 g/dL Final    Hematocrit 04/18/2024 35.0  34.0 - 46.6 % Final    MCV 04/18/2024 93.8  79.0 - 97.0 fL Final    MCH 04/18/2024 31.4  26.6 - 33.0 pg Final    MCHC 04/18/2024 33.4  31.5 - 35.7 g/dL Final    RDW 04/18/2024 11.4 (L)  12.3 - 15.4 % Final    RDW-SD 04/18/2024 37.9  37.0 - 54.0 fl Final    MPV 04/18/2024 11.4  6.0 - 12.0 fL Final    Platelets 04/18/2024 259  140 - 450 10*3/mm3 Final    Glucose 04/18/2024 85  65 - 99 mg/dL Final    BUN 04/18/2024 12  6 - 20 mg/dL Final    Creatinine 04/18/2024 0.72  0.57 - 1.00 mg/dL Final    Sodium  04/18/2024 138  136 - 145 mmol/L Final    Potassium 04/18/2024 3.8  3.5 - 5.2 mmol/L Final    Chloride 04/18/2024 104  98 - 107 mmol/L Final    CO2 04/18/2024 22.0  22.0 - 29.0 mmol/L Final    Calcium 04/18/2024 9.5  8.6 - 10.5 mg/dL Final    Total Protein 04/18/2024 7.3  6.0 - 8.5 g/dL Final    Albumin 04/18/2024 4.9  3.5 - 5.2 g/dL Final    ALT (SGPT) 04/18/2024 16  1 - 33 U/L Final    AST (SGOT) 04/18/2024 16  1 - 32 U/L Final    Alkaline Phosphatase 04/18/2024 61  39 - 117 U/L Final    Total Bilirubin 04/18/2024 2.1 (H)  0.0 - 1.2 mg/dL Final    Globulin 04/18/2024 2.4  gm/dL Final    A/G Ratio 04/18/2024 2.0  g/dL Final    BUN/Creatinine Ratio 04/18/2024 16.7  7.0 - 25.0 Final    Anion Gap 04/18/2024 12.0  5.0 - 15.0 mmol/L Final    eGFR 04/18/2024 119.2  >60.0 mL/min/1.73 Final    H. pylori Breath Test 04/18/2024 Negative  Negative Final    Protime 04/18/2024 14.3  12.3 - 15.1 Seconds Final    INR 04/18/2024 1.06  0.90 - 1.10 Final    Hep A Total Ab 04/18/2024 Positive (A)  Negative Final    Hep B S Ab 04/18/2024 Non-Reactive   Final    Hepatitis B Surface Ag 04/18/2024 Non-Reactive  Non-Reactive Final    Hep B Core Total Ab 04/18/2024 Negative  Negative Final    Hepatitis C Ab 04/18/2024 Non-Reactive  Non-Reactive Final    CARLYN Direct 04/18/2024 Negative  Negative Final    Smooth Muscle Ab 04/18/2024 9  0 - 19 Units Final    Mitochondrial Ab 04/18/2024 <20.0  0.0 - 20.0 Units Final      Dated 10/15/2021 IgA 159, TTG IgA <2    Comprehensive metabolic panel (07/11/2023 09:14)  CBC w AUTO Differential (07/11/2023 09:14)  Iron and TIBC (07/11/2023 09:14)  Ferritin (07/11/2023 09:14)  Vitamin B12 (07/11/2023 09:14)  TSH (07/11/2023 09:14)  Bilirubin, Total & Direct (07/11/2023 09:14)     CT Abdomen Pelvis With Contrast     Result Date: 3/7/2024  Impression: 1.No acute abnormality identified within the abdomen or pelvis. 2.Normal appendix. 3.Moderate colonic stool. 4.3.2 cm left adnexal cyst. Mild pelvic free fluid,  possibly physiologic. 5.Additional findings as detailed above.    The liver is unremarkable in morphology. No focal liver lesion is seen. No biliary dilation is seen.     Assessment / Plan      1. Right sided abdominal pain  2. Constipation, unspecified constipation type  3. Diarrhea, unspecified type  4. Irritable bowel syndrome with both constipation and diarrhea - Suspected  5. Anemia, unspecified type  She has a longstanding history of right sided abdominal pain with constipation alternating with diarrhea that is unchanged.  Symptoms come and go.  Denies any GI bleeding.  No history of nausea or vomiting.  Denies reflux or difficulty swallowing.  H. pylori breath test negative.  Labs dated 7/11/2023 with CBC unremarkable, iron panel normal, ferritin normal, B12 normal, TSH normal. Previous celiac panel normal.  Recent labs dated 4/18/2024 with hemoglobin decreased at 11.7.  CTAP dated 3/7/2024 with moderate colonic stool burden, otherwise unremarkable.  Suspect IBS at baseline.  Low FODMAP diet  Metamucil or fiber Gummies daily.  MiraLAX or stool softeners daily as needed for constipation.  Fecal calprotectin  Will schedule colonoscopy and EGD for evaluation of anemia/ongoing symptoms.    - Case Request  - sodium-potassium-magnesium sulfates (Suprep Bowel Prep Kit) 17.5-3.13-1.6 GM/177ML solution oral solution; Use as directed for colonoscopy prep. Patient has instructions.  Dispense: 177 mL; Refill: 0  - bisacodyl (DULCOLAX) 5 MG EC tablet; Take as directed for colon prep  Dispense: 4 tablet; Refill: 0    6. Elevated bilirubin  7. Gilbert syndrome  She has a history of elevated bilirubin dating back to 2018 per chart.  Direct bilirubin unremarkable in 2018, 2021, 2023, and in 2024.  CTAP dated 3/7/2024 with liver unremarkable.  She is negative for hepatitis B and hepatitis C.  She is immune to hepatitis A.  CARLYN negative.  Smooth muscle antibody normal.  Mitochondrial antibody normal. CTAP dated 3/7/2024 with  liver unremarkable. Likely Gilbert's syndrome.  No treatment needed for Gilbert's syndrome.  She is not immune to hepatitis B and may obtain vaccines through the health department or PCP office.    Patient Instructions   High fiber, low fat diet with liberal water intake.   Low FODMAP diet - avoid all dairy. May use lactose free/dairy free alternatives such as almond milk, rice milk, oat milk, etc.   Metamucil 1 packet/scoop or or fiber gummies 2-4 per day.   May use Miralax 17 grams daily as needed for constipation.   May use stool softeners 2 per day as needed for constipation.   Stool study  The patient is not immune to hepatitis B and may obtain vaccines through the health department or PCP office.   Upper endoscopy-EGD: The indications, technique, alternatives and potential risk and complications were discussed with the patient including but not limited to bleeding, perforations, missing lesions and anesthetic complications. The patient understands and wishes to proceed with the procedure and has given their verbal consent. Written patient education information was given to the patient.   Colonoscopy: The indications, technique, alternatives and potential risk and complications were discussed with the patient including but not limited to bleeding, perforations, missing lesions and anesthetic complications. The patient understands and wishes to proceed with the procedure and has given their verbal consent. Written patient education information was given to the patient.   The patient will call if they have further questions before procedure.         Low-FODMAP Eating Plan    FODMAP stands for fermentable oligosaccharides, disaccharides, monosaccharides, and polyols. These are sugars that are hard for some people to digest. A low-FODMAP eating plan may help some people who have irritable bowel syndrome (IBS) and certain other bowel (intestinal) diseases to manage their symptoms.  This meal plan can be complicated  to follow. Work with a diet and nutrition specialist (dietitian) to make a low-FODMAP eating plan that is right for you. A dietitian can help make sure that you get enough nutrition from this diet.  What are tips for following this plan?  Reading food labels  Check labels for hidden FODMAPs such as:  High-fructose syrup.  Honey.  Agave.  Natural fruit flavors.  Onion or garlic powder.  Choose low-FODMAP foods that contain 3-4 grams of fiber per serving.  Check food labels for serving sizes. Eat only one serving at a time to make sure FODMAP levels stay low.  Shopping  Shop with a list of foods that are recommended on this diet and make a meal plan.  Meal planning  Follow a low-FODMAP eating plan for up to 6 weeks, or as told by your health care provider or dietitian.  To follow the eating plan:  Eliminate high-FODMAP foods from your diet completely. Choose only low-FODMAP foods to eat. You will do this for 2-6 weeks.  Gradually reintroduce high-FODMAP foods into your diet one at a time. Most people should wait a few days before introducing the next new high-FODMAP food into their meal plan. Your dietitian can recommend how quickly you may reintroduce foods.  Keep a daily record of what and how much you eat and drink. Make note of any symptoms that you have after eating.  Review your daily record with a dietitian regularly to identify which foods you can eat and which foods you should avoid.  General tips  Drink enough fluid each day to keep your urine pale yellow.  Avoid processed foods. These often have added sugar and may be high in FODMAPs.  Avoid most dairy products, whole grains, and sweeteners.  Work with a dietitian to make sure you get enough fiber in your diet.  Avoid high FODMAP foods at meals to manage symptoms.     Recommended foods  Fruits  Bananas, oranges, tangerines, flako, limes, blueberries, raspberries, strawberries, grapes, cantaloupe, honeydew melon, kiwi, papaya, passion fruit, and pineapple.  "Limited amounts of dried cranberries, banana chips, and shredded coconut.  Vegetables  Eggplant, zucchini, cucumber, peppers, green beans, bean sprouts, lettuce, arugula, kale, Swiss chard, spinach, antoinette greens, bok malina, summer squash, potato, and tomato. Limited amounts of corn, carrot, and sweet potato. Green parts of scallions.  Grains  Gluten-free grains, such as rice, oats, buckwheat, quinoa, corn, polenta, and millet. Gluten-free pasta, bread, or cereal. Rice noodles. Corn tortillas.  Meats and other proteins  Unseasoned beef, pork, poultry, or fish. Eggs. Sharpe. Tofu (firm) and tempeh. Limited amounts of nuts and seeds, such as almonds, walnuts, brazil nuts, pecans, peanuts, nut butters, pumpkin seeds, daily seeds, and sunflower seeds.  Dairy  Lactose-free milk, yogurt, and kefir. Lactose-free cottage cheese and ice cream. Non-dairy milks, such as almond, coconut, hemp, and rice milk. Non-dairy yogurt. Limited amounts of goat cheese, brie, mozzarella, parmesan, swiss, and other hard cheeses.  Fats and oils  Butter-free spreads. Vegetable oils, such as olive, canola, and sunflower oil.  Seasoning and other foods  Artificial sweeteners with names that do not end in \"ol,\" such as aspartame, saccharine, and stevia. Maple syrup, white table sugar, raw sugar, brown sugar, and molasses. Mayonnaise, soy sauce, and tamari. Fresh basil, coriander, parsley, rosemary, and thyme.  Beverages  Water and mineral water. Sugar-sweetened soft drinks. Small amounts of orange juice or cranberry juice. Black and green tea. Most dry verna. Coffee.  The items listed above may not be a complete list of foods and beverages you can eat. Contact a dietitian for more information.     Foods to avoid  Fruits  Fresh, dried, and juiced forms of apple, pear, watermelon, peach, plum, cherries, apricots, blackberries, boysenberries, figs, nectarines, and abhishek. Avocado.  Vegetables  Chicory root, artichoke, asparagus, cabbage, snow peas, " Maynard sprouts, broccoli, sugar snap peas, mushrooms, celery, and cauliflower. Onions, garlic, leeks, and the white part of scallions.  Grains  Wheat, including kamut, durum, and semolina. Barley and bulgur. Couscous. Wheat-based cereals. Wheat noodles, bread, crackers, and pastries.  Meats and other proteins  Fried or fatty meat. Sausage. Cashews and pistachios. Soybeans, baked beans, black beans, chickpeas, kidney beans, luna beans, navy beans, lentils, black-eyed peas, and split peas.  Dairy  Milk, yogurt, ice cream, and soft cheese. Cream and sour cream. Milk-based sauces. Custard. Buttermilk. Soy milk.  Seasoning and other foods  Any sugar-free gum or candy. Foods that contain artificial sweeteners such as sorbitol, mannitol, isomalt, or xylitol. Foods that contain honey, high-fructose corn syrup, or agave. Bouillon, vegetable stock, beef stock, and chicken stock. Garlic and onion powder. Condiments made with onion, such as hummus, chutney, pickles, relish, salad dressing, and salsa. Tomato paste.  Beverages  Chicory-based drinks. Coffee substitutes. Chamomile tea. Fennel tea. Sweet or fortified verna such as port or amari. Diet soft drinks made with isomalt, mannitol, maltitol, sorbitol, or xylitol. Apple, pear, and abhishek juice. Juices with high-fructose corn syrup.  The items listed above may not be a complete list of foods and beverages you should avoid. Contact a dietitian for more information.     Summary  FODMAP stands for fermentable oligosaccharides, disaccharides, monosaccharides, and polyols. These are sugars that are hard for some people to digest.  A low-FODMAP eating plan is a short-term diet that helps to ease symptoms of certain bowel diseases.  The eating plan usually lasts up to 6 weeks. After that, high-FODMAP foods are reintroduced gradually and one at a time. This can help you find out which foods may be causing symptoms.  A low-FODMAP eating plan can be complicated. It is best to work  with a dietitian who has experience with this type of plan.  This information is not intended to replace advice given to you by your health care provider. Make sure you discuss any questions you have with your health care provider.  Document Revised: 05/06/2021 Document Reviewed: 05/06/2021  [employer] Patient Education © 2023 [employer]     Nikolai Hopkins, APRN  7/10/2024    Please note that portions of this note were completed with a voice recognition program.

## 2024-07-10 NOTE — PATIENT INSTRUCTIONS
High fiber, low fat diet with liberal water intake.   Low FODMAP diet - avoid all dairy. May use lactose free/dairy free alternatives such as almond milk, rice milk, oat milk, etc.   Metamucil 1 packet/scoop or or fiber gummies 2-4 per day.   May use Miralax 17 grams daily as needed for constipation.   May use stool softeners 2 per day as needed for constipation.   Stool study  The patient is not immune to hepatitis B and may obtain vaccines through the health department or PCP office.   Upper endoscopy-EGD: The indications, technique, alternatives and potential risk and complications were discussed with the patient including but not limited to bleeding, perforations, missing lesions and anesthetic complications. The patient understands and wishes to proceed with the procedure and has given their verbal consent. Written patient education information was given to the patient.   Colonoscopy: The indications, technique, alternatives and potential risk and complications were discussed with the patient including but not limited to bleeding, perforations, missing lesions and anesthetic complications. The patient understands and wishes to proceed with the procedure and has given their verbal consent. Written patient education information was given to the patient.   The patient will call if they have further questions before procedure.         Low-FODMAP Eating Plan    FODMAP stands for fermentable oligosaccharides, disaccharides, monosaccharides, and polyols. These are sugars that are hard for some people to digest. A low-FODMAP eating plan may help some people who have irritable bowel syndrome (IBS) and certain other bowel (intestinal) diseases to manage their symptoms.  This meal plan can be complicated to follow. Work with a diet and nutrition specialist (dietitian) to make a low-FODMAP eating plan that is right for you. A dietitian can help make sure that you get enough nutrition from this diet.  What are tips for  following this plan?  Reading food labels  Check labels for hidden FODMAPs such as:  High-fructose syrup.  Honey.  Agave.  Natural fruit flavors.  Onion or garlic powder.  Choose low-FODMAP foods that contain 3-4 grams of fiber per serving.  Check food labels for serving sizes. Eat only one serving at a time to make sure FODMAP levels stay low.  Shopping  Shop with a list of foods that are recommended on this diet and make a meal plan.  Meal planning  Follow a low-FODMAP eating plan for up to 6 weeks, or as told by your health care provider or dietitian.  To follow the eating plan:  Eliminate high-FODMAP foods from your diet completely. Choose only low-FODMAP foods to eat. You will do this for 2-6 weeks.  Gradually reintroduce high-FODMAP foods into your diet one at a time. Most people should wait a few days before introducing the next new high-FODMAP food into their meal plan. Your dietitian can recommend how quickly you may reintroduce foods.  Keep a daily record of what and how much you eat and drink. Make note of any symptoms that you have after eating.  Review your daily record with a dietitian regularly to identify which foods you can eat and which foods you should avoid.  General tips  Drink enough fluid each day to keep your urine pale yellow.  Avoid processed foods. These often have added sugar and may be high in FODMAPs.  Avoid most dairy products, whole grains, and sweeteners.  Work with a dietitian to make sure you get enough fiber in your diet.  Avoid high FODMAP foods at meals to manage symptoms.     Recommended foods  Fruits  Bananas, oranges, tangerines, flako, limes, blueberries, raspberries, strawberries, grapes, cantaloupe, honeydew melon, kiwi, papaya, passion fruit, and pineapple. Limited amounts of dried cranberries, banana chips, and shredded coconut.  Vegetables  Eggplant, zucchini, cucumber, peppers, green beans, bean sprouts, lettuce, arugula, kale, Swiss chard, spinach, antoinette greens,  "bok malina, summer squash, potato, and tomato. Limited amounts of corn, carrot, and sweet potato. Green parts of scallions.  Grains  Gluten-free grains, such as rice, oats, buckwheat, quinoa, corn, polenta, and millet. Gluten-free pasta, bread, or cereal. Rice noodles. Corn tortillas.  Meats and other proteins  Unseasoned beef, pork, poultry, or fish. Eggs. Sharpe. Tofu (firm) and tempeh. Limited amounts of nuts and seeds, such as almonds, walnuts, brazil nuts, pecans, peanuts, nut butters, pumpkin seeds, daily seeds, and sunflower seeds.  Dairy  Lactose-free milk, yogurt, and kefir. Lactose-free cottage cheese and ice cream. Non-dairy milks, such as almond, coconut, hemp, and rice milk. Non-dairy yogurt. Limited amounts of goat cheese, brie, mozzarella, parmesan, swiss, and other hard cheeses.  Fats and oils  Butter-free spreads. Vegetable oils, such as olive, canola, and sunflower oil.  Seasoning and other foods  Artificial sweeteners with names that do not end in \"ol,\" such as aspartame, saccharine, and stevia. Maple syrup, white table sugar, raw sugar, brown sugar, and molasses. Mayonnaise, soy sauce, and tamari. Fresh basil, coriander, parsley, rosemary, and thyme.  Beverages  Water and mineral water. Sugar-sweetened soft drinks. Small amounts of orange juice or cranberry juice. Black and green tea. Most dry verna. Coffee.  The items listed above may not be a complete list of foods and beverages you can eat. Contact a dietitian for more information.     Foods to avoid  Fruits  Fresh, dried, and juiced forms of apple, pear, watermelon, peach, plum, cherries, apricots, blackberries, boysenberries, figs, nectarines, and abhishek. Avocado.  Vegetables  Chicory root, artichoke, asparagus, cabbage, snow peas, [place] sprouts, broccoli, sugar snap peas, mushrooms, celery, and cauliflower. Onions, garlic, leeks, and the white part of scallions.  Grains  Wheat, including kamut, durum, and semolina. Barley and bulgur. " Couscous. Wheat-based cereals. Wheat noodles, bread, crackers, and pastries.  Meats and other proteins  Fried or fatty meat. Sausage. Cashews and pistachios. Soybeans, baked beans, black beans, chickpeas, kidney beans, luna beans, navy beans, lentils, black-eyed peas, and split peas.  Dairy  Milk, yogurt, ice cream, and soft cheese. Cream and sour cream. Milk-based sauces. Custard. Buttermilk. Soy milk.  Seasoning and other foods  Any sugar-free gum or candy. Foods that contain artificial sweeteners such as sorbitol, mannitol, isomalt, or xylitol. Foods that contain honey, high-fructose corn syrup, or agave. Bouillon, vegetable stock, beef stock, and chicken stock. Garlic and onion powder. Condiments made with onion, such as hummus, chutney, pickles, relish, salad dressing, and salsa. Tomato paste.  Beverages  Chicory-based drinks. Coffee substitutes. Chamomile tea. Fennel tea. Sweet or fortified verna such as port or amari. Diet soft drinks made with isomalt, mannitol, maltitol, sorbitol, or xylitol. Apple, pear, and abhishek juice. Juices with high-fructose corn syrup.  The items listed above may not be a complete list of foods and beverages you should avoid. Contact a dietitian for more information.     Summary  FODMAP stands for fermentable oligosaccharides, disaccharides, monosaccharides, and polyols. These are sugars that are hard for some people to digest.  A low-FODMAP eating plan is a short-term diet that helps to ease symptoms of certain bowel diseases.  The eating plan usually lasts up to 6 weeks. After that, high-FODMAP foods are reintroduced gradually and one at a time. This can help you find out which foods may be causing symptoms.  A low-FODMAP eating plan can be complicated. It is best to work with a dietitian who has experience with this type of plan.  This information is not intended to replace advice given to you by your health care provider. Make sure you discuss any questions you have with your  health care provider.  Document Revised: 05/06/2021 Document Reviewed: 05/06/2021  Elsevier Patient Education © 2023 [employer]

## 2024-07-23 ENCOUNTER — HOSPITAL ENCOUNTER (OUTPATIENT)
Dept: ULTRASOUND IMAGING | Facility: HOSPITAL | Age: 24
Discharge: HOME | End: 2024-07-23
Admitting: OBSTETRICS & GYNECOLOGY
Payer: COMMERCIAL

## 2024-07-23 DIAGNOSIS — N28.1 RENAL CYST: ICD-10-CM

## 2024-07-23 PROCEDURE — 76775 US EXAM ABDO BACK WALL LIM: CPT

## 2024-07-29 ENCOUNTER — TELEPHONE (OUTPATIENT)
Dept: INTERNAL MEDICINE | Facility: CLINIC | Age: 24
End: 2024-07-29
Payer: COMMERCIAL

## 2024-07-29 NOTE — TELEPHONE ENCOUNTER
Caller: Araseli Martins    Relationship: Self    Best call back number: [phone]     Caller requesting test results: PATIENT    What test was performed: KIDNEY ULTRASOUND    When was the test performed: 7/23/24    Where was the test performed: [facility]    Additional notes: PATIENT WOULD LIKE TO TALK WITH DEWAYNE REGARDING THE RESULTS.

## 2024-08-15 ENCOUNTER — TELEPHONE (OUTPATIENT)
Dept: GASTROENTEROLOGY | Facility: CLINIC | Age: 24
End: 2024-08-15
Payer: COMMERCIAL

## 2024-08-15 NOTE — TELEPHONE ENCOUNTER
PATIENT IS SCHEDULED FOR COLONOSCOPY AND UPPER ENDOSCOPY WITH DR. BARRON ON 8/23/24 WITH ARRIVAL TIME OF 1100 AM. CALLED PATIENT TO CONFIRM APPOINTMENT.  REACHED VOICEMAIL. LEFT DETAILED MESSAGE FOR RETURN CALL TO CONFIRM    OK TO LEAVE CONFIRMATION WITH THE HUB

## 2024-08-20 NOTE — PRE-PROCEDURE INSTRUCTIONS
PAT phone history completed with patient for upcoming procedure on 8/23/24 with Dr. Velez.    PAT PASS reviewed with patient and they verbalize understanding of the following:     Do not eat or drink anything after midnight the night before procedure unless otherwise instructed by physician/surgeon's office, this includes no gum, candy, mints, tobacco products or e-cigarettes.  Do not shave the area to be operated on at least 48 hours prior to procedure.  Do not wear makeup, lotion, hair products, or nail polish.  Do not wear any jewelry and remove all piercings.  Do not wear any adhesive if you wear dentures.  Do not wear contacts; bring in glasses if needed.  Only take medications on the morning of procedure as instructed by PAT nurse per anesthesia guidelines or as instructed by physician's office.  If you are on any blood thinners reach out to the physician/surgeon's office for instructions on when/if they will need to be stopped prior to procedure.  Bring in picture ID and insurance card, advanced directive copies if applicable, CPAP/BIPAP/Inhalers if indicated morning of procedure, leave any other valuables at home.  Ensure you have arranged for someone to drive you home the day of your procedure and someone to care for you at home afterwards. It is recommended that you do not drive, drink alcohol, or make any major legal decisions for at least 24 hours after your procedure is complete.    Instructions given on hospital entrance and registration location.

## 2024-08-21 ENCOUNTER — TELEPHONE (OUTPATIENT)
Dept: GASTROENTEROLOGY | Facility: CLINIC | Age: 24
End: 2024-08-21
Payer: COMMERCIAL

## 2024-08-21 PROBLEM — R09.81 NASAL CONGESTION: Status: ACTIVE | Noted: 2024-08-21

## 2024-08-21 NOTE — TELEPHONE ENCOUNTER
Called patient, rescheduled her to 09/10/24.     Psychological condition is improving with treatment.  Continue current treatment regimen.  Psychological condition will be reassessed in 3 months.

## 2024-08-21 NOTE — TELEPHONE ENCOUNTER
----- Message from Rosa Maria FLAHERTY sent at 8/21/2024  3:57 PM EDT -----  Regarding: FW: pt has COVID    ----- Message -----  From: Rosa Maria Mcrae RN  Sent: 8/21/2024   3:55 PM EDT  To: [facility]  Subject: pt has COVID                                     Pt just informed me she tested positive for COVID today, with symptoms starting yesterday. As far as I know the policy is still to reschedule for 10 days after the start of symptoms.  I told her you would call her to reschedule.

## 2024-09-09 ENCOUNTER — ANESTHESIA EVENT (OUTPATIENT)
Dept: GASTROENTEROLOGY | Facility: HOSPITAL | Age: 24
End: 2024-09-09
Payer: COMMERCIAL

## 2024-09-09 NOTE — ANESTHESIA PREPROCEDURE EVALUATION
Anesthesia Evaluation     Patient summary reviewed and Nursing notes reviewed   no history of anesthetic complications:   NPO Solid Status: > 8 hours  NPO Liquid Status: > 8 hours           Airway   Mallampati: II  TM distance: >3 FB  Possible difficult intubation and No difficulty expected  Dental      Pulmonary    (+) ,sleep apnea  (-) not a smoker  Cardiovascular         Neuro/Psych  (+) psychiatric history Anxiety and Depression  GI/Hepatic/Renal/Endo    (+) obesity    Musculoskeletal     (+) arthralgias, myalgias  Abdominal    Substance History      OB/GYN          Other                    Anesthesia Plan    ASA 2     MAC     (Risks and benefits discussed including risk of aspiration, recall and dental damage. All patient questions answered.    Will continue with plan of care.)  intravenous induction     Anesthetic plan, risks, benefits, and alternatives have been provided, discussed and informed consent has been obtained with: patient.  Pre-procedure education provided    CODE STATUS:

## 2024-09-10 ENCOUNTER — ANESTHESIA (OUTPATIENT)
Dept: GASTROENTEROLOGY | Facility: HOSPITAL | Age: 24
End: 2024-09-10
Payer: COMMERCIAL

## 2025-01-17 ENCOUNTER — TELEPHONE (OUTPATIENT)
Dept: GASTROENTEROLOGY | Facility: CLINIC | Age: 25
End: 2025-01-17
Payer: COMMERCIAL

## 2025-01-17 NOTE — TELEPHONE ENCOUNTER
----- Message from Sarah KNOWLES sent at 1/13/2025 11:47 AM EST -----  Regarding: PATIENT REQUEST  Patient wants to reschedule procedures

## 2025-01-22 ENCOUNTER — OFFICE VISIT (OUTPATIENT)
Dept: INTERNAL MEDICINE | Facility: CLINIC | Age: 25
End: 2025-01-22
Payer: COMMERCIAL

## 2025-01-22 VITALS
BODY MASS INDEX: 24.73 KG/M2 | HEART RATE: 85 BPM | SYSTOLIC BLOOD PRESSURE: 119 MMHG | HEIGHT: 69 IN | TEMPERATURE: 98.6 F | RESPIRATION RATE: 20 BRPM | OXYGEN SATURATION: 100 % | WEIGHT: 167 LBS | DIASTOLIC BLOOD PRESSURE: 77 MMHG

## 2025-01-22 DIAGNOSIS — R51.9 SHARP HEADACHE: Primary | ICD-10-CM

## 2025-01-22 DIAGNOSIS — R68.84 JAW PAIN: ICD-10-CM

## 2025-01-22 PROCEDURE — 99213 OFFICE O/P EST LOW 20 MIN: CPT | Performed by: NURSE PRACTITIONER

## 2025-01-22 RX ORDER — INDOMETHACIN 50 MG/1
50 CAPSULE ORAL 3 TIMES DAILY PRN
Qty: 30 CAPSULE | Refills: 0 | Status: SHIPPED | OUTPATIENT
Start: 2025-01-22

## 2025-01-22 RX ORDER — FLUTICASONE PROPIONATE 50 MCG
2 SPRAY, SUSPENSION (ML) NASAL DAILY
Qty: 18.2 G | Refills: 1 | Status: SHIPPED | OUTPATIENT
Start: 2025-01-22

## 2025-01-27 NOTE — PROGRESS NOTES
Chief Complaint / Reason:      Chief Complaint   Patient presents with    Headache     Sharp stabbing pain in head about 3 months, denies injury, vision issues  Felt disoriented on 2 occasions  Pain is always behind right eye       Subjective     History of Present Illness  The patient is a 24-year-old female with 3 months of intermittent, sharp, stabbing headaches localized behind her right eye. She felt disoriented twice but has no head injury or vision issues. Headaches onset suddenly, lasting a second, without triggers like sexual activity or bending over. No history of migraines or vomiting. One episode included severe pain with facial numbness and tingling. Occasional tingling in hands and face, possibly anxiety-related. Not on birth control, no recent Tylenol use, consumes one cup of coffee daily, no recent viral illnesses. No Flonase use, unsure of sleeping position, no reported teeth clenching/grinding, has unfilled cavities due to dental anxiety. Up to date on Pap smear. Reports right-sided jaw pain, throbbing throughout the day.    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    History taken from: patient    PMH/FH/Social History were reviewed and updated appropriately in the electronic medical record.   Past Medical History:   Diagnosis Date    Anemia     Anxiety     Gallstones     Iron deficiency     Ovarian cyst     Snores      Past Surgical History:   Procedure Laterality Date    CHOLECYSTECTOMY      at age 7, + stones    WISDOM TOOTH EXTRACTION  2015     Social History     Socioeconomic History    Marital status: Single   Tobacco Use    Smoking status: Never    Smokeless tobacco: Never   Vaping Use    Vaping status: Never Used   Substance and Sexual Activity    Alcohol use: Not Currently     Comment: social    Drug use: Never    Sexual activity: Yes     Partners: Male     Birth control/protection: Condom     Family History   Problem Relation Age of Onset     Migraines Mother     Osteoporosis Maternal Grandmother     Stroke Maternal Grandmother     Diabetes Maternal Grandfather     Heart attack Maternal Grandfather     Hypertension Maternal Grandfather     Stroke Paternal Grandmother     Diabetes Cousin     Hypertension Father     Colon cancer Neg Hx        Review of Systems:   Review of Systems      All other systems were reviewed and are negative.  Exceptions are noted in the subjective or above.      Objective     Vital Signs  Vitals:    01/22/25 1527   BP: 119/77   Pulse: 85   Resp: 20   Temp: 98.6 °F (37 °C)   SpO2: 100%       Body mass index is 24.65 kg/m².  BMI is within normal parameters. No other follow-up for BMI required.       Physical Exam  Vitals and nursing note reviewed.   Constitutional:       General: She is not in acute distress.     Appearance: She is well-developed.   HENT:      Head: Normocephalic and atraumatic.      Right Ear: Ear canal and external ear normal. Tympanic membrane is erythematous and bulging.      Left Ear: Ear canal and external ear normal. Tympanic membrane is erythematous and bulging.      Nose: Mucosal edema present. No rhinorrhea.      Right Sinus: No maxillary sinus tenderness or frontal sinus tenderness.      Left Sinus: No maxillary sinus tenderness or frontal sinus tenderness.      Mouth/Throat:      Mouth: Mucous membranes are dry.      Pharynx: Posterior oropharyngeal erythema present.      Comments: PND  Crepitus noted with opening closing of mouth  Eyes:      Conjunctiva/sclera: Conjunctivae normal.   Cardiovascular:      Rate and Rhythm: Normal rate and regular rhythm.      Heart sounds: Normal heart sounds.   Pulmonary:      Effort: Pulmonary effort is normal. No respiratory distress.      Breath sounds: Normal breath sounds.   Lymphadenopathy:      Head:      Right side of head: No submental, submandibular or tonsillar adenopathy.      Left side of head: No submental, submandibular or tonsillar adenopathy.       Cervical: No cervical adenopathy.   Skin:     General: Skin is warm and dry.      Capillary Refill: Capillary refill takes less than 2 seconds.      Findings: No rash.   Neurological:      Mental Status: She is alert and oriented to person, place, and time.   Psychiatric:         Behavior: Behavior normal.         Thought Content: Thought content normal.         Judgment: Judgment normal.              Results Review:    I reviewed the patient's new clinical results.       Medication Review:     Current Outpatient Medications:     bisacodyl (DULCOLAX) 5 MG EC tablet, Take as directed for colon prep (Patient not taking: Reported on 1/22/2025), Disp: 4 tablet, Rfl: 0    brompheniramine-pseudoephedrine-DM 30-2-10 MG/5ML syrup, Take 5 mL by mouth 4 (Four) Times a Day As Needed for Allergies. (Patient not taking: Reported on 1/22/2025), Disp: 240 mL, Rfl: 0    fluticasone (FLONASE) 50 MCG/ACT nasal spray, Administer 2 sprays into the nostril(s) as directed by provider Daily., Disp: 18.2 g, Rfl: 1    indomethacin (INDOCIN) 50 MG capsule, Take 1 capsule by mouth 3 (Three) Times a Day As Needed for Moderate Pain., Disp: 30 capsule, Rfl: 0    sodium-potassium-magnesium sulfates (Suprep Bowel Prep Kit) 17.5-3.13-1.6 GM/177ML solution oral solution, Use as directed for colonoscopy prep. Patient has instructions. (Patient not taking: Reported on 1/22/2025), Disp: 177 mL, Rfl: 0    There are no diagnoses linked to this encounter.  Assessment & Plan  1. Headaches: Etiology uncertain, possible trigeminal neuralgia or caffeine withdrawal  - No head imaging done  - B12 levels normal  - Due for annual physical and blood work  - Advised to monitor triggers  - Schedule ophthalmologist appointment  - Record headache frequency  - Wear sunglasses  - Prescribed indomethacin 30 tablets with food  - Use Flonase with head tilt  - Advised annual physical and fasting blood work  - Use Apple watch for sleep monitoring  - Consider imaging if  headaches persist    2. Jaw pain: Possible teeth clenching/grinding contributing to headaches  - Unfilled cavities due to dental anxiety  - Advised to monitor for clenching/grinding  - Consider muscle relaxer if pain persists  - Get cavities filled    No follow-ups on file.    KEVIN Rosado  01/22/2025    Patient or patient representative verbalized consent for the use of Ambient Listening during the visit with  KEVIN Rosado for chart documentation.

## 2025-01-30 ENCOUNTER — HOSPITAL ENCOUNTER (EMERGENCY)
Facility: HOSPITAL | Age: 25
Discharge: HOME | End: 2025-01-30
Attending: STUDENT IN AN ORGANIZED HEALTH CARE EDUCATION/TRAINING PROGRAM
Payer: COMMERCIAL

## 2025-01-30 ENCOUNTER — TELEPHONE (OUTPATIENT)
Dept: INTERNAL MEDICINE | Facility: CLINIC | Age: 25
End: 2025-01-30
Payer: COMMERCIAL

## 2025-01-30 ENCOUNTER — APPOINTMENT (OUTPATIENT)
Dept: CT IMAGING | Facility: HOSPITAL | Age: 25
End: 2025-01-30
Payer: COMMERCIAL

## 2025-01-30 VITALS
BODY MASS INDEX: 24.73 KG/M2 | SYSTOLIC BLOOD PRESSURE: 133 MMHG | RESPIRATION RATE: 18 BRPM | HEIGHT: 69 IN | OXYGEN SATURATION: 96 % | TEMPERATURE: 98 F | HEART RATE: 96 BPM | DIASTOLIC BLOOD PRESSURE: 66 MMHG | WEIGHT: 167 LBS

## 2025-01-30 DIAGNOSIS — R51.9 NONINTRACTABLE HEADACHE, UNSPECIFIED CHRONICITY PATTERN, UNSPECIFIED HEADACHE TYPE: Primary | ICD-10-CM

## 2025-01-30 LAB — HCG SERPL QL: NEGATIVE

## 2025-01-30 PROCEDURE — 25510000001 IOPAMIDOL 61 % SOLUTION: Performed by: STUDENT IN AN ORGANIZED HEALTH CARE EDUCATION/TRAINING PROGRAM

## 2025-01-30 PROCEDURE — 84703 CHORIONIC GONADOTROPIN ASSAY: CPT | Performed by: STUDENT IN AN ORGANIZED HEALTH CARE EDUCATION/TRAINING PROGRAM

## 2025-01-30 PROCEDURE — 70496 CT ANGIOGRAPHY HEAD: CPT

## 2025-01-30 PROCEDURE — 70450 CT HEAD/BRAIN W/O DYE: CPT

## 2025-01-30 PROCEDURE — 99285 EMERGENCY DEPT VISIT HI MDM: CPT | Performed by: STUDENT IN AN ORGANIZED HEALTH CARE EDUCATION/TRAINING PROGRAM

## 2025-01-30 RX ORDER — IOPAMIDOL 612 MG/ML
100 INJECTION, SOLUTION INTRAVASCULAR
Status: COMPLETED | OUTPATIENT
Start: 2025-01-30 | End: 2025-01-30

## 2025-01-30 RX ORDER — SODIUM CHLORIDE 0.9 % (FLUSH) 0.9 %
10 SYRINGE (ML) INJECTION AS NEEDED
Status: DISCONTINUED | OUTPATIENT
Start: 2025-01-30 | End: 2025-01-30 | Stop reason: HOSPADM

## 2025-01-30 RX ADMIN — IOPAMIDOL 100 ML: 612 INJECTION, SOLUTION INTRAVENOUS at 11:43

## 2025-01-30 NOTE — DISCHARGE INSTRUCTIONS
Instructions from Dr. Soto:    It was a privilege being your ER physician today.    Please follow-up in clinic as we discussed.    Please return to the ER if you experience any worsening symptoms or for any other concerns.

## 2025-01-30 NOTE — ED PROVIDER NOTES
[facility]  Emergency Department Encounter  Emergency Medicine Physician Note       Pt Name: Araseli Martins  MRN: [id number]  Pt :   2000  Room Number:  24SF/24  Date of encounter:  2025  PCP: Karlene Monterroso APRN  ED Physician: Jeff Soto DO    HPI:  Araseli Martins is a 24 y.o. female who presents to the ED with chief complaint of HEADACHE.  Patient reports intermittent right-sided headache for the past 2 months.  Over the weekend states that her pupils were of abnormal size.  She called her PCP which prompted ED evaluation today. Currently no headache. No other associated symptoms including blurred vision, pain with eye movement, slurred speech, numbness remains in the extremities, chest pain, shortness of breath, neck pain.  Denies any trauma.    PAST MEDICAL HISTORY  Past Medical History:   Diagnosis Date    Anemia     Anxiety     Gallstones     Iron deficiency     Ovarian cyst     Snores      Current Outpatient Medications   Medication Instructions    bisacodyl (DULCOLAX) 5 MG EC tablet Take as directed for colon prep    brompheniramine-pseudoephedrine-DM 30-2-10 MG/5ML syrup 5 mL, Oral, 4 Times Daily PRN    fluticasone (FLONASE) 50 MCG/ACT nasal spray 2 sprays, Nasal, Daily    indomethacin (INDOCIN) 50 mg, Oral, 3 Times Daily PRN    sodium-potassium-magnesium sulfates (Suprep Bowel Prep Kit) 17.5-3.13-1.6 GM/177ML solution oral solution Use as directed for colonoscopy prep. Patient has instructions.      PAST SURGICAL HISTORY  Past Surgical History:   Procedure Laterality Date    CHOLECYSTECTOMY      at age 7, + stones    WISDOM TOOTH EXTRACTION         FAMILY HISTORY  Family History   Problem Relation Age of Onset    Migraines Mother     Osteoporosis Maternal Grandmother     Stroke Maternal Grandmother     Diabetes Maternal Grandfather     Heart attack Maternal Grandfather     Hypertension Maternal Grandfather     Stroke Paternal Grandmother     Diabetes Cousin      Hypertension Father     Colon cancer Neg Hx        SOCIAL HISTORY  Social History     Socioeconomic History    Marital status: Single   Tobacco Use    Smoking status: Never    Smokeless tobacco: Never   Vaping Use    Vaping status: Never Used   Substance and Sexual Activity    Alcohol use: Not Currently     Comment: social    Drug use: Never    Sexual activity: Yes     Partners: Male     Birth control/protection: Condom     ALLERGIES  Penicillins    REVIEW OF SYSTEMS  All systems reviewed and negative except for those discussed in HPI.     PHYSICAL EXAM  ED Triage Vitals [01/30/25 0945]   Temp Heart Rate Resp BP SpO2   98 °F (36.7 °C) 100 18 131/77 98 %      Temp src Heart Rate Source Patient Position BP Location FiO2 (%)   -- -- -- -- --     I have reviewed the triage vital signs and nursing notes.    General: Alert.  Nontoxic appearance.  No acute distress.  Head: Normocephalic.  Atraumatic.  Eyes: Pupils 2 mm.  PERRLA.  EOMI.  No scleral icterus.  Cardiovascular: Regular rate and rhythm.  No murmurs.  No rubs.  2+ distal pulses bilaterally.  Respiratory: Equal breath sounds bilaterally.  No rales.  No rhonchi.  No wheezing.  GI: Abdomen is soft.  Nondistended.  Nontender to palpation.  No rebound.  No guarding.  No CVA tenderness.  MSK: No muscle atrophy.  Neurologic: Oriented x 3.  Speech intact without dysarthria or aphasia. Cranial nerves II-XII intact.  Strength 5/5 bilateral upper and lower extremities.  Sensation to touch grossly intact bilaterally.  No focal deficits.  No pronator drift.  Normal finger-to-nose test.  Normal gait.  Skin: No erythema. No edema.  Psych: Normal mood and affect.     LAB RESULTS  Recent Results (from the past 24 hours)   hCG, Serum, Qualitative    Collection Time: 01/30/25 10:34 AM    Specimen: Blood   Result Value Ref Range    HCG Qualitative Negative Negative       RADIOLOGY  CT Angiogram Head    Result Date: 1/30/2025  PROCEDURE: CT ANGIOGRAM HEAD-  HISTORY: Headache   TECHNIQUE: Thin section axial CT with  and without IV contrast supplemented with multiplanar 3 D reconstruction of the head. This study was performed with techniques to keep radiation doses as low as reasonably achievable, (ALARA). Individualized dose reduction techniques using automated exposure control or adjustment of mA and/or kV according to the patient size were employed.  FINDINGS:  Head CT: The ventricles are proper size. There is no evidence of hemorrhage. No masses are identified. No extra-axial fluid is seen. The sinuses are unremarkable.  CTA: The cranial circulation is unremarkable. There is no significant stenosis, aneurysm, or occlusion.      No acute process.   CTDI: 38.44 mGy DLP:694.94 mGy.cm  This report was signed and finalized on 1/30/2025 11:54 AM by Shantel Ny MD.      CT Head Without Contrast    Result Date: 1/30/2025  PROCEDURE: CT HEAD WO CONTRAST-  HISTORY: Headache, no trauma  COMPARISON: None.  TECHNIQUE: Multiple axial CT images were performed from the foramen magnum to the vertex. Individualized dose reduction techniques using automated exposure control or adjustment of mA and/or kV according to the patient size were employed.  FINDINGS: The brain parenchyma is unremarkable.  The ventricles are proper size. There is no evidence of hemorrhage. No masses are identified. No abnormal extra-axial fluid is seen. The paranasal sinuses and mastoid air cells are unremarkable.      No acute intracranial process.     This report was signed and finalized on 1/30/2025 11:51 AM by Shantel Ny MD.       PROCEDURES  Procedures    RISK STRATIFICATION    MEDICAL DECISION MAKING  24 y.o. female with past medical history listed above who presents with headache.    Vital signs within normal limits.    Based on clinical presentation and physical exam, differential diagnosis includes, but is not limited to, migraine headache, tension headache, cluster headache, less likely intracranial structural  abnormality, aneurysm, vascular abnormality.    At least 3 different tests have been ordered on this patient.    Medications administered in ED:  Medications   sodium chloride 0.9 % flush 10 mL (has no administration in time range)   iopamidol (ISOVUE-300) 61 % injection 100 mL (100 mL Intravenous Given 1/30/25 1143)     Please see ED course below for my interpretation of the ED workup.  ED Course as of 01/30/25 1222   Thu Jan 30, 2025   1211 I have independently reviewed and interpreted the imaging listed above.  My interpretations are listed below:    -CT head negative for intracranial hemorrhage or mass effect.    -CTA head and neck negative for aneurysm.     [JS]      ED Course User Index  [JS] Jeff Soto DO     On re-evaluation, patient resting comfortably. Continues not to endorse any active headache or symptoms.  Vital signs remained stable on room air.  Patient was ambulatory in the ED with steady gait.  Able to tolerate oral intake appropriately.    I discussed the findings of the ED workup with the patient at bedside.  No clinical indication for admission.  I recommended outpatient follow-up with PCP/neurology.  Patient was deemed medically stable for discharge with close outpatient follow-up and strict ED return precautions. Patient agreeable with plan and disposition.    Chronic conditions affecting care: None    Social determinants of health impacting treatment or disposition: None    REPEAT VITAL SIGNS  AS OF 12:22 EST VITALS:  BP - 133/66  HR - 96  TEMP - 98 °F (36.7 °C)  O2 SATS - 96%    DIAGNOSIS  Final diagnoses:   Nonintractable headache, unspecified chronicity pattern, unspecified headache type     DISPOSITION  ED Disposition       ED Disposition   Discharge    Condition   Stable    Comment   --               PATIENT REFERRALS  [facility] NEUROLOGY  [address]  [facility] [address] [phone]        Karlene Monterroso, APRN  [address] [address] [phone]      PCP. 48 hours.            Please note that portions of this document were completed with voice recognition software.        Jeff Soto DO  01/30/25 1536

## 2025-01-30 NOTE — Clinical Note
[facility] EMERGENCY DEPARTMENT  [address] [address]  Phone: [phone]    Araseli Martins was seen and treated in our emergency department on 1/30/2025.  She may return to work on 02/01/2025.         Thank you for choosing [facility].    Jeff Soto DO

## 2025-01-30 NOTE — TELEPHONE ENCOUNTER
Patient states that she went to the ER and is feeling better.  She wondered if her PCP could look at the note and imaging and if she needs to see her before her apt next week to give to her a call.  Phone number verified.

## 2025-01-30 NOTE — TELEPHONE ENCOUNTER
Caller: Araseli Martins    Relationship to patient: Self    Best call back number: [phone]     Chief complaint: PUPILS DILATED- SENT PICTURES     Patient directed to call 911 or go to their nearest emergency room.     Patient verbalized understanding: [x] Yes  [] No  If no, why?    Additional notes: PATIENT WILL BE GOING TO THE [facility] ER. SPOKE TO SOMEONE AT THE OFFICE. DID NOT KNOW THE PROCESS TO GO TO ER... PATIENT WAS INSTRUCTED AND STATES SHE WILL GO NOW.

## 2025-02-05 ENCOUNTER — OFFICE VISIT (OUTPATIENT)
Dept: INTERNAL MEDICINE | Facility: CLINIC | Age: 25
End: 2025-02-05
Payer: COMMERCIAL

## 2025-02-05 VITALS
DIASTOLIC BLOOD PRESSURE: 68 MMHG | HEART RATE: 86 BPM | TEMPERATURE: 98.7 F | WEIGHT: 168 LBS | BODY MASS INDEX: 24.88 KG/M2 | SYSTOLIC BLOOD PRESSURE: 102 MMHG | OXYGEN SATURATION: 100 % | RESPIRATION RATE: 18 BRPM | HEIGHT: 69 IN

## 2025-02-05 DIAGNOSIS — Z13.228 SCREENING FOR ENDOCRINE, NUTRITIONAL, METABOLIC AND IMMUNITY DISORDER: ICD-10-CM

## 2025-02-05 DIAGNOSIS — E80.4 GILBERT SYNDROME: ICD-10-CM

## 2025-02-05 DIAGNOSIS — Z13.21 SCREENING FOR ENDOCRINE, NUTRITIONAL, METABOLIC AND IMMUNITY DISORDER: ICD-10-CM

## 2025-02-05 DIAGNOSIS — E55.9 VITAMIN D INSUFFICIENCY: ICD-10-CM

## 2025-02-05 DIAGNOSIS — Z00.00 PHYSICAL EXAM, ANNUAL: Primary | ICD-10-CM

## 2025-02-05 DIAGNOSIS — Z13.29 SCREENING FOR ENDOCRINE, NUTRITIONAL, METABOLIC AND IMMUNITY DISORDER: ICD-10-CM

## 2025-02-05 DIAGNOSIS — L98.9 GENERALIZED SKIN LESIONS: ICD-10-CM

## 2025-02-05 DIAGNOSIS — Z13.0 SCREENING FOR ENDOCRINE, NUTRITIONAL, METABOLIC AND IMMUNITY DISORDER: ICD-10-CM

## 2025-02-05 PROCEDURE — 99395 PREV VISIT EST AGE 18-39: CPT | Performed by: NURSE PRACTITIONER

## 2025-02-05 RX ORDER — INDOMETHACIN 50 MG/1
50 CAPSULE ORAL 3 TIMES DAILY PRN
Qty: 30 CAPSULE | Refills: 0 | Status: CANCELLED | OUTPATIENT
Start: 2025-02-05

## 2025-02-05 RX ORDER — BACLOFEN 20 MG
1 TABLET ORAL DAILY
Qty: 30 TABLET | Refills: 3 | Status: SHIPPED | OUTPATIENT
Start: 2025-02-05

## 2025-02-05 RX ORDER — FLUTICASONE PROPIONATE 50 MCG
2 SPRAY, SUSPENSION (ML) NASAL DAILY
Qty: 18.2 G | Refills: 1 | Status: CANCELLED | OUTPATIENT
Start: 2025-02-05

## 2025-02-05 NOTE — PROGRESS NOTES
Chief Complaint   Patient presents with    Annual Exam       Araseli Martins is a 24 y.o. female and is here for a comprehensive physical exam.   History of Present Illness  The patient is a 24-year-old female presenting for an annual physical. She was previously seen in the ER for a headache and anisocoria. A CT angio of the head was negative.    She reports persistent anisocoria and headaches with no improvement. She has an appointment with a neurologist on 2025 at 1:30 PM. She describes the sensation as pressure rather than pain. She has not sought chiropractic care for neck discomfort and does not believe her symptoms indicate migraines. No dental issues but mentions potential eyebrow swelling. An ophthalmologist at [employer] found no abnormalities in eye images. No nausea or increased pressure when leaning over. Not taking magnesium for headaches.    She maintains a regular exercise routine, including walking and running. She experiences constant side pain and is interested in a full body scan. History of cholecystectomy. Reports bloating and consumes 1-2 cups of coffee daily. Believes her iron levels are low and has observed yellowing of her eyes.    Up to date on vision check. Dental cleaning done 6 months ago. Interested in a dermatology referral for a skin check. Pap smear in  was normal. In a long-term monogamous relationship.    SOCIAL HISTORY  Smokes.    FAMILY HISTORY  No family history of cervical cancer.        History:  LMP: Patient's last menstrual period was 2025 (approximate).  Last pap date:  Abnormal pap? no  : 0  Para: 0  STD:none  Age of menarche:11  Sexually active:17 (males)  Abuse:none  Has boyfriend    Do you take any herbs or supplements that were not prescribed by a doctor? no  Are you taking calcium supplements? no  Are you taking aspirin daily? no      Health Habits:  Dental Exam. up to date  Eye Exam. up to date  Dermatology.not up to date -  "advised patient to schedule or closely monitor for changes in skin lesions  Exercise: 4 times/week.  Current exercise activities include: walking    Health Maintenance   Topic Date Due    BMI FOLLOWUP  Never done    HPV VACCINES (2 - 3-dose series) 09/22/2015    ANNUAL PHYSICAL  02/03/2024    INFLUENZA VACCINE  03/31/2025 (Originally 7/1/2024)    COVID-19 Vaccine (1 - 2024-25 season) 01/22/2026 (Originally 9/1/2024)    PAP SMEAR  02/03/2026    TDAP/TD VACCINES (3 - Td or Tdap) 08/22/2032    HEPATITIS C SCREENING  Completed    Pneumococcal Vaccine 0-49  Aged Out       PMH, PSH, SocHx, FamHx, Allergies, and Medications: Reviewed and updated in the Visit Navigator.     Allergies   Allergen Reactions    Penicillins Rash     Past Medical History:   Diagnosis Date    Anemia     Anxiety     Gallstones     Iron deficiency     Ovarian cyst     Snores      Past Surgical History:   Procedure Laterality Date    CHOLECYSTECTOMY      at age 7, + stones    WISDOM TOOTH EXTRACTION  2015     Social History     Socioeconomic History    Marital status: Single   Tobacco Use    Smoking status: Never     Passive exposure: Never    Smokeless tobacco: Never   Vaping Use    Vaping status: Never Used   Substance and Sexual Activity    Alcohol use: Yes     Comment: social    Drug use: Never    Sexual activity: Defer     Family History   Problem Relation Age of Onset    Migraines Mother     Hypertension Father     Osteoporosis Maternal Grandmother     Stroke Maternal Grandmother     Diabetes Maternal Grandfather     Heart attack Maternal Grandfather     Hypertension Maternal Grandfather     Stroke Paternal Grandmother     Diabetes Cousin     Colon cancer Neg Hx        Review of Systems  Review of Systems      Vitals:    02/05/25 1553   BP: 102/68   Pulse: 86   Resp: 18   Temp: 98.7 °F (37.1 °C)   SpO2: 100%       Objective   /68   Pulse 86   Temp 98.7 °F (37.1 °C)   Resp 18   Ht 175.3 cm (69\")   Wt 76.2 kg (168 lb)   LMP 01/09/2025 " (Approximate)   SpO2 100%   BMI 24.81 kg/m²   BMI is within normal parameters. No other follow-up for BMI required.      Physical Exam  Vitals and nursing note reviewed.   Constitutional:       General: She is not in acute distress.     Appearance: Normal appearance. She is well-developed.   HENT:      Head: Normocephalic and atraumatic.      Right Ear: Hearing, ear canal and external ear normal. Tympanic membrane is erythematous and bulging.      Left Ear: Hearing, ear canal and external ear normal. Tympanic membrane is erythematous and bulging.      Nose: Mucosal edema present. No rhinorrhea.      Right Sinus: No maxillary sinus tenderness or frontal sinus tenderness.      Left Sinus: No maxillary sinus tenderness or frontal sinus tenderness.      Mouth/Throat:      Mouth: Mucous membranes are dry.      Dentition: Normal dentition.      Pharynx: Posterior oropharyngeal erythema present.      Comments: PND    Eyes:      Conjunctiva/sclera: Conjunctivae normal.      Pupils: Pupils are equal, round, and reactive to light.   Neck:      Thyroid: No thyroid mass or thyromegaly.      Vascular: No carotid bruit or JVD.   Cardiovascular:      Rate and Rhythm: Normal rate and regular rhythm.      Heart sounds: Normal heart sounds, S1 normal and S2 normal. No murmur heard.  Pulmonary:      Effort: Pulmonary effort is normal. No respiratory distress.      Breath sounds: Normal breath sounds.   Abdominal:      General: Bowel sounds are normal. There is no distension or abdominal bruit.      Palpations: Abdomen is soft. There is no mass.      Tenderness: There is generalized abdominal tenderness and tenderness in the right lower quadrant. There is no right CVA tenderness, left CVA tenderness, guarding or rebound. Negative signs include Coy's sign, Rovsing's sign, McBurney's sign, psoas sign and obturator sign.      Hernia: No hernia is present.   Genitourinary:     Comments: deferred  Musculoskeletal:         General:  Normal range of motion.      Cervical back: Normal range of motion and neck supple.   Lymphadenopathy:      Head:      Right side of head: No submental, submandibular or tonsillar adenopathy.      Left side of head: No submental, submandibular or tonsillar adenopathy.      Cervical: No cervical adenopathy.   Skin:     General: Skin is warm and dry.      Capillary Refill: Capillary refill takes less than 2 seconds.      Findings: No rash.      Nails: There is no clubbing.   Neurological:      Mental Status: She is alert and oriented to person, place, and time.      Cranial Nerves: No cranial nerve deficit.      Sensory: No sensory deficit.      Gait: Gait normal.   Psychiatric:         Behavior: Behavior normal.         Thought Content: Thought content normal.         Judgment: Judgment normal.         Results for orders placed during the hospital encounter of 01/30/25    CT Angiogram Head    Narrative  PROCEDURE: CT ANGIOGRAM HEAD-    HISTORY: Headache    TECHNIQUE: Thin section axial CT with  and without IV contrast  supplemented with multiplanar 3 D reconstruction of the head. This study  was performed with techniques to keep radiation doses as low as  reasonably achievable, (ALARA). Individualized dose reduction techniques  using automated exposure control or adjustment of mA and/or kV according  to the patient size were employed.    FINDINGS:    Head CT: The ventricles are proper size. There is no evidence of  hemorrhage. No masses are identified. No extra-axial fluid is seen. The  sinuses are unremarkable.    CTA: The cranial circulation is unremarkable. There is no significant  stenosis, aneurysm, or occlusion.    Impression  No acute process.      CTDI: 38.44 mGy  DLP:694.94 mGy.cm    This report was signed and finalized on 1/30/2025 11:54 AM by Shantel Ny MD.  Results for orders placed during the hospital encounter of 01/30/25    CT Head Without Contrast    Narrative  PROCEDURE: CT HEAD WO  CONTRAST-    HISTORY: Headache, no trauma    COMPARISON: None.    TECHNIQUE: Multiple axial CT images were performed from the foramen  magnum to the vertex. Individualized dose reduction techniques using  automated exposure control or adjustment of mA and/or kV according to  the patient size were employed.    FINDINGS: The brain parenchyma is unremarkable.  The ventricles are  proper size. There is no evidence of hemorrhage. No masses are  identified. No abnormal extra-axial fluid is seen. The paranasal sinuses  and mastoid air cells are unremarkable.    Impression  No acute intracranial process.          This report was signed and finalized on 1/30/2025 11:51 AM by Shantel Ny MD.       Assessment & Plan   1. Healthy female exam.    2. Patient Counseling: Including but not Limited to the following, when appropriate:  --Nutrition: Stressed importance of moderation in sodium/caffeine intake, saturated fat and cholesterol, caloric balance, sufficient intake of fresh fruits, vegetables, fiber, calcium, iron, and 1 mg of folate supplement per day (for females capable of pregnancy).  --Discussed the issue of estrogen replacement, calcium supplement, and the daily use of baby aspirin.  --Exercise: Stressed the importance of regular exercise.   --Substance Abuse: Discussed cessation/primary prevention of tobacco, alcohol, or other drug use; driving or other dangerous activities under the influence; availability of treatment for abuse, as indicated based on social history.    --Sexuality: Discussed sexually transmitted diseases, partner selection, use of condoms, avoidance of unintended pregnancy  and contraceptive alternatives.   --Injury prevention: Discussed safety belts, safety helmets, smoke detector, smoking near bedding or upholstery.   --Dental health: Discussed importance of regular tooth brushing, flossing, and dental visits.  --Immunizations reviewed.  --Discussed benefits of colon cancer screening.      3.  Discussed the patient's BMI with her.  The BMI is in the acceptable range  4. No follow-ups on file.  5. Age-appropriate Screening Scheduled      Assessment & Plan   Assessment & Plan  1. Headaches  - Imaging (CT angiogram and CT head without contrast) unremarkable  - Discussed possibility of intracranial hypertension  - Advised to keep neurologist appointment on 02/27/2024 at 1:30 PM  - Prescribed magnesium for nighttime use to aid relaxation and alleviate symptoms  - Prescription sent to [facility]    2. Health maintenance  - Last Pap smear in 2023 was normal, not due for another until 2026  - Iron levels stable  - Referral to dermatology for skin check  - Advised to maintain good posture and limit coffee intake  - Ordered fasting labs: vitamin D, B12, thyroid, kidney function, liver function, and bilirubin  - Instructed to fast after midnight, water or black coffee permitted    3. Abdominal pain  - Reports persistent side pain and bloating  - Differential diagnosis includes gastrointestinal issues or cysts  - Consider transvaginal ultrasound if symptoms persist    PROCEDURE  History of cholecystectomy.    Diagnoses and all orders for this visit:    1. Physical exam, annual (Primary)  -     Comprehensive Metabolic Panel  -     Lipid Panel  -     CBC Auto Differential    2. Generalized skin lesions  -     Ambulatory Referral to Dermatology    3. Gilbert syndrome  -     Bilirubin, Direct    4. Screening for endocrine, nutritional, metabolic and immunity disorder  -     Hemoglobin A1c  -     Vitamin B12  -     TSH  -     T4, Free  -     Bilirubin, Direct    5. Vitamin D insufficiency  -     Vitamin D,25-Hydroxy    Other orders  -     Magnesium Oxide -Mg Supplement 500 MG tablet; Take 1 tablet by mouth Daily. (Patient not taking: Reported on 2/18/2025)  Dispense: 30 tablet; Refill: 3           Patient or patient representative verbalized consent for the use of Ambient Listening during the visit with  KEVIN Rosado  for chart documentation.         Karlene Monterroso, APRN 02/05/2025

## 2025-02-18 NOTE — PRE-PROCEDURE INSTRUCTIONS
PAT phone history completed with patient for upcoming procedure on 2/21/25 with Dr. Velez.    PAT PASS reviewed with patient and they verbalize understanding of the following:     Do not eat or drink anything after midnight the night before procedure unless otherwise instructed by physician/surgeon's office, this includes no gum, candy, mints, tobacco products or e-cigarettes.  Do not shave the area to be operated on at least 48 hours prior to procedure.  Do not wear makeup, lotion, hair products, or nail polish.  Do not wear any jewelry and remove all piercings.  Do not wear any adhesive if you wear dentures.  Do not wear contacts; bring in glasses if needed.  Only take medications on the morning of procedure as instructed by PAT nurse per anesthesia guidelines or as instructed by physician's office.  If you are on any blood thinners reach out to the physician/surgeon's office for instructions on when/if they will need to be stopped prior to procedure.  Bring in picture ID and insurance card, advanced directive copies if applicable, CPAP/BIPAP/Inhalers if indicated morning of procedure, leave any other valuables at home.  Ensure you have arranged for someone to drive you home the day of your procedure and someone to care for you at home afterwards. It is recommended that you do not drive, drink alcohol, or make any major legal decisions for at least 24 hours after your procedure is complete.  ERAS instructions given unless otherwise instructed per surgeon's orders.    Instructions given on hospital entrance and registration location.

## 2025-02-20 ENCOUNTER — OFFICE VISIT (OUTPATIENT)
Dept: NEUROLOGY | Facility: CLINIC | Age: 25
End: 2025-02-20
Payer: COMMERCIAL

## 2025-02-20 VITALS
HEIGHT: 69 IN | RESPIRATION RATE: 14 BRPM | SYSTOLIC BLOOD PRESSURE: 102 MMHG | HEART RATE: 74 BPM | TEMPERATURE: 97.8 F | OXYGEN SATURATION: 100 % | DIASTOLIC BLOOD PRESSURE: 64 MMHG | BODY MASS INDEX: 25.48 KG/M2 | WEIGHT: 172 LBS

## 2025-02-20 DIAGNOSIS — G44.85 PRIMARY STABBING HEADACHE: Primary | ICD-10-CM

## 2025-02-20 RX ORDER — TOPIRAMATE 25 MG/1
25 TABLET, FILM COATED ORAL 2 TIMES DAILY
Qty: 60 TABLET | Refills: 2 | Status: SHIPPED | OUTPATIENT
Start: 2025-02-20

## 2025-02-20 RX ORDER — SUMATRIPTAN 50 MG/1
50 TABLET, FILM COATED ORAL ONCE AS NEEDED
Qty: 9 TABLET | Refills: 2 | Status: SHIPPED | OUTPATIENT
Start: 2025-02-20

## 2025-02-20 NOTE — PROGRESS NOTES
New Patient Office Visit      Patient Name: Araseli Martins  : 2000   MRN: [id number]     Referring Physician: Jeff Soto DO    Chief Complaint:    Chief Complaint   Patient presents with    Establish Care     Migraine; pt reports she has had right sided headaches for about 4 months. She states it doesn't feel like a typical headache, more like a sharp pain behind her eye. Pt reports  HA days    CT HEAD on 2025.  Last eye exam was at [facility] 2 weeks ago and pt states it was normal.        History of Present Illness: Araseli Martins is a 24 y.o. female who is here today to establish care with Neurology.  She has never bee seen before in Neurology. She was referred to us from [facility] ([facility] for HA, where she was seen 25.     HDM . Has pain around her right eye that is present daily and waxes and wanes with severity with random onset about 4 months ago. Sharp pain that is knife like and single stab like pain distribution and start to the temple that is unilateral and only right sided. She has pain around her right eye and retro-orbitally. HA can last a few seconds > 2 minutes. No eye watering. Right nostril will run during HA, but runs all the time. + light sensitivity. No sound sensitivity. No N/V. She has been ignoring them and tries to push through her day. She has taken Tylenol but doesn't feel this is effective as HA last < 2 minutes. She reports she will have unequal pupils in the right eye sometimes with pain. Pain can radiate into her face. She has been dizzy in past with headaches. Last vision exam  ([facility]). She has been prescribed magnesium for migraine prevention and has not started medication yet.     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 Lives alone. Works [employer]. No tobacco/nicotine. No ETOH. No recreational drugs. No PMH of TBI    [facility] Neuro: NONE    Recent Imaging:    CT  Angiogram - noncontributory   CT Head WO 01/25- noncontributory    Pertinent Medical History:    Subjective      Review of Systems:   Review of Systems   Constitutional: Negative.    HENT: Negative.     Eyes: Negative.  Negative for blurred vision, double vision and visual disturbance.   Respiratory: Negative.     Cardiovascular: Negative.    Gastrointestinal: Negative.    Endocrine: Negative.    Genitourinary: Negative.    Musculoskeletal: Negative.    Skin: Negative.    Allergic/Immunologic: Negative.    Neurological:  Positive for headache.   Hematological: Negative.    Psychiatric/Behavioral: Negative.     All other systems reviewed and are negative.      Past Medical History:   Past Medical History:   Diagnosis Date    Anemia     Anxiety     Gallstones     Iron deficiency     Ovarian cyst     Snores        Past Surgical History:   Past Surgical History:   Procedure Laterality Date    CHOLECYSTECTOMY      at age 7, + stones    WISDOM TOOTH EXTRACTION  2015       Family History:   Family History   Problem Relation Age of Onset    Migraines Mother     Hypertension Father     Osteoporosis Maternal Grandmother     Stroke Maternal Grandmother     Diabetes Maternal Grandfather     Heart attack Maternal Grandfather     Hypertension Maternal Grandfather     Stroke Paternal Grandmother     Diabetes Cousin     Colon cancer Neg Hx        Social History:   Social History     Socioeconomic History    Marital status: Single   Tobacco Use    Smoking status: Never     Passive exposure: Never    Smokeless tobacco: Never   Vaping Use    Vaping status: Never Used   Substance and Sexual Activity    Alcohol use: Yes     Comment: social    Drug use: Never    Sexual activity: Defer       Medications:     Current Outpatient Medications:     fluticasone (FLONASE) 50 MCG/ACT nasal spray, Administer 2 sprays into the nostril(s) as directed by provider Daily. (Patient not taking: Reported on 2/18/2025), Disp: 18.2 g, Rfl: 1    Magnesium  "Oxide -Mg Supplement 500 MG tablet, Take 1 tablet by mouth Daily. (Patient not taking: Reported on 2/18/2025), Disp: 30 tablet, Rfl: 3    SUMAtriptan (Imitrex) 50 MG tablet, Take 1 tablet by mouth 1 (One) Time As Needed for Migraine. Take one tablet at onset of headache. May repeat dose one time in 2 hours if headache not relieved., Disp: 9 tablet, Rfl: 2    topiramate (Topamax) 25 MG tablet, Take 1 tablet by mouth 2 (Two) Times a Day., Disp: 60 tablet, Rfl: 2    Allergies:   Allergies   Allergen Reactions    Penicillins Rash       Objective     Physical Exam:  Vital Signs:   Vitals:    02/20/25 1317   BP: 102/64   BP Location: Right arm   Patient Position: Sitting   Cuff Size: Adult   Pulse: 74   Resp: 14   Temp: 97.8 °F (36.6 °C)   TempSrc: Infrared   SpO2: 100%   Weight: 78 kg (172 lb)   Height: 175.3 cm (69.02\")   PainSc: 0-No pain     Body mass index is 25.39 kg/m².     Physical Exam  Vitals and nursing note reviewed.   Constitutional:       General: She is not in acute distress.     Appearance: Normal appearance.   HENT:      Head: Normocephalic.      Nose: Nose normal.      Mouth/Throat:      Mouth: Mucous membranes are moist.      Pharynx: Oropharynx is clear.   Eyes:      Extraocular Movements: Extraocular movements intact.      Conjunctiva/sclera: Conjunctivae normal.   Musculoskeletal:      Cervical back: Normal range of motion and neck supple.   Skin:     General: Skin is warm and dry.      Capillary Refill: Capillary refill takes less than 2 seconds.   Neurological:      General: No focal deficit present.      Mental Status: She is alert and oriented to person, place, and time.   Psychiatric:         Mood and Affect: Mood normal.         Behavior: Behavior normal.         Neurological Exam  Mental Status  Alert. Oriented to person, place, and time.    Cranial Nerves  CN III, IV, VI: Extraocular movements intact bilaterally.      PHQ-9 Total Score:       Assessment / Plan      Assessment/Plan: "   Diagnoses and all orders for this visit:    1. Primary stabbing headache (Primary)  -     topiramate (Topamax) 25 MG tablet; Take 1 tablet by mouth 2 (Two) Times a Day.  Dispense: 60 tablet; Refill: 2  -     SUMAtriptan (Imitrex) 50 MG tablet; Take 1 tablet by mouth 1 (One) Time As Needed for Migraine. Take one tablet at onset of headache. May repeat dose one time in 2 hours if headache not relieved.  Dispense: 9 tablet; Refill: 2         Follow Up:   Return in about 3 months (around 5/20/2025), or if symptoms worsen or fail to improve.    Anticipatory guidance and safety reviewed  Patient education provided  ER records reviewed and discussed  Begin Topamax 25 mg twice daily (prevention); SE reviewed.  Discussed need for contraception/ avoidance of pregnancy  Begin Imitrex 50 mg PRN (Abortive); SE reviewed  Will consider Sed/CRP with continued issues and gabapentin therapy   May take Nsaids OTC PRN for pain; educated to use < 2 times per week to prevention MOH    RTC PRN or within 12 weeks or sooner with issues     KEVIN Dickerson  [facility] Neurology and Sleep Medicine

## 2025-02-21 ENCOUNTER — HOSPITAL ENCOUNTER (OUTPATIENT)
Facility: HOSPITAL | Age: 25
Setting detail: HOSPITAL OUTPATIENT SURGERY
Discharge: HOME | End: 2025-02-21
Attending: INTERNAL MEDICINE | Admitting: INTERNAL MEDICINE
Payer: COMMERCIAL

## 2025-02-21 VITALS
DIASTOLIC BLOOD PRESSURE: 70 MMHG | SYSTOLIC BLOOD PRESSURE: 120 MMHG | WEIGHT: 168 LBS | BODY MASS INDEX: 24.88 KG/M2 | HEIGHT: 69 IN | HEART RATE: 73 BPM | OXYGEN SATURATION: 98 % | TEMPERATURE: 99 F | RESPIRATION RATE: 16 BRPM

## 2025-02-21 DIAGNOSIS — R10.9 RIGHT SIDED ABDOMINAL PAIN: ICD-10-CM

## 2025-02-21 DIAGNOSIS — D64.9 ANEMIA, UNSPECIFIED TYPE: ICD-10-CM

## 2025-02-21 DIAGNOSIS — K59.00 CONSTIPATION, UNSPECIFIED CONSTIPATION TYPE: ICD-10-CM

## 2025-02-21 DIAGNOSIS — R19.7 DIARRHEA, UNSPECIFIED TYPE: ICD-10-CM

## 2025-02-21 LAB
B-HCG UR QL: NEGATIVE
EXPIRATION DATE: NORMAL
INTERNAL NEGATIVE CONTROL: NEGATIVE
INTERNAL POSITIVE CONTROL: POSITIVE
Lab: NORMAL

## 2025-02-21 PROCEDURE — 25010000002 LIDOCAINE (CARDIAC): Performed by: NURSE ANESTHETIST, CERTIFIED REGISTERED

## 2025-02-21 PROCEDURE — 25810000003 SODIUM CHLORIDE 0.9 % SOLUTION: Performed by: INTERNAL MEDICINE

## 2025-02-21 PROCEDURE — 45380 COLONOSCOPY AND BIOPSY: CPT | Performed by: INTERNAL MEDICINE

## 2025-02-21 PROCEDURE — 25010000002 PROPOFOL 10 MG/ML EMULSION: Performed by: NURSE ANESTHETIST, CERTIFIED REGISTERED

## 2025-02-21 PROCEDURE — 81025 URINE PREGNANCY TEST: CPT | Performed by: INTERNAL MEDICINE

## 2025-02-21 PROCEDURE — 43239 EGD BIOPSY SINGLE/MULTIPLE: CPT | Performed by: INTERNAL MEDICINE

## 2025-02-21 RX ORDER — SODIUM CHLORIDE 0.9 % (FLUSH) 0.9 %
10 SYRINGE (ML) INJECTION AS NEEDED
Status: DISCONTINUED | OUTPATIENT
Start: 2025-02-21 | End: 2025-02-21 | Stop reason: HOSPADM

## 2025-02-21 RX ORDER — SODIUM CHLORIDE 9 MG/ML
50 INJECTION, SOLUTION INTRAVENOUS CONTINUOUS
Status: DISCONTINUED | OUTPATIENT
Start: 2025-02-21 | End: 2025-02-21 | Stop reason: HOSPADM

## 2025-02-21 RX ORDER — SODIUM CHLORIDE 9 MG/ML
30 INJECTION, SOLUTION INTRAVENOUS CONTINUOUS
Status: DISCONTINUED | OUTPATIENT
Start: 2025-02-21 | End: 2025-02-21

## 2025-02-21 RX ORDER — SIMETHICONE 40MG/0.6ML
SUSPENSION, DROPS(FINAL DOSAGE FORM)(ML) ORAL AS NEEDED
Status: DISCONTINUED | OUTPATIENT
Start: 2025-02-21 | End: 2025-02-21 | Stop reason: HOSPADM

## 2025-02-21 RX ORDER — PROPOFOL 10 MG/ML
VIAL (ML) INTRAVENOUS CONTINUOUS PRN
Status: DISCONTINUED | OUTPATIENT
Start: 2025-02-21 | End: 2025-02-21 | Stop reason: SURG

## 2025-02-21 RX ADMIN — LIDOCAINE HYDROCHLORIDE 60 MG: 20 INJECTION, SOLUTION INTRAVENOUS at 10:10

## 2025-02-21 RX ADMIN — PROPOFOL 300 MCG/KG/MIN: 10 INJECTION, EMULSION INTRAVENOUS at 10:10

## 2025-02-21 RX ADMIN — SODIUM CHLORIDE 50 ML/HR: 9 INJECTION, SOLUTION INTRAVENOUS at 07:58

## 2025-02-21 NOTE — ANESTHESIA POSTPROCEDURE EVALUATION
Patient: Araseli Martins    Procedure Summary       Date: 02/21/25 Room / Location: [facility] ENDOSCOPY 2 / [facility] ENDOSCOPY    Anesthesia Start: 1005 Anesthesia Stop: 1040    Procedures:       ESOPHAGOGASTRODUODENOSCOPY with biopsy (Esophagus)      COLONOSCOPY (Anus) Diagnosis:       Right sided abdominal pain      Constipation, unspecified constipation type      Diarrhea, unspecified type      Anemia, unspecified type      (Right sided abdominal pain [R10.9])      (Constipation, unspecified constipation type [K59.00])      (Diarrhea, unspecified type [R19.7])      (Anemia, unspecified type [D64.9])    Surgeons: Jory Velez MD Provider: Christophe Pepe CRNA    Anesthesia Type: MAC ASA Status: 2            Anesthesia Type: MAC    Vitals  No vitals data found for the desired time range.          Post Anesthesia Care and Evaluation    Patient location during evaluation: PHASE II  Patient participation: complete - patient participated  Level of consciousness: awake and alert  Pain score: 0  Pain management: satisfactory to patient    Airway patency: patent  Anesthetic complications: No anesthetic complications  PONV Status: none  Cardiovascular status: acceptable and stable  Respiratory status: acceptable and spontaneous ventilation  Hydration status: acceptable    Comments: Vitals signs as noted in nursing documentation as per protocol.

## 2025-02-21 NOTE — DISCHARGE INSTRUCTIONS
Rest today  No pushing,pulling,tugging,heavy lifting, or strenuous activity   No major decision making,driving,or drinking alcoholic beverages for 24 hours due to the sedation you received  Always use good hand hygiene/washing technique  No driving on pain medication.    To assist you in voiding:  Drink plenty of fluids  Listen to running water while attempting to void.    If you are unable to urinate and you have an uncomfortable urge to void or it has been   6 hours since you were discharged, return to the Emergency Room.    - Discharge patient to home (ambulatory).   - Trial of FODMAP diet.   - Metamcuil 1-2 fiber cap po daily  - Iron studies / Vit B12 , FA as op as op.   - Fecal carlos as op.   - If iron deficiency and elevated calprotectim- recommend small bowel pillcam   - Await pathology results.   - Return to my office in 8 weeks.

## 2025-02-21 NOTE — H&P
[facility]  HISTORY AND PHYSICAL    Patient Name: Araseli Martins  : 2000  MRN: [id number]    Chief Complaint:   For EGD/ colonoscopy    History Of Presenting Illness:    Anemia  Change in bowel habit    Past Medical History:   Diagnosis Date    Anemia     Anxiety     Gallstones     Iron deficiency     Ovarian cyst     Snores        Past Surgical History:   Procedure Laterality Date    CHOLECYSTECTOMY      at age 7, + stones    WISDOM TOOTH EXTRACTION  2015       Social History     Socioeconomic History    Marital status: Single   Tobacco Use    Smoking status: Never     Passive exposure: Never    Smokeless tobacco: Never   Vaping Use    Vaping status: Never Used   Substance and Sexual Activity    Alcohol use: Yes     Comment: social    Drug use: Never    Sexual activity: Defer       Family History   Problem Relation Age of Onset    Migraines Mother     Hypertension Father     Osteoporosis Maternal Grandmother     Stroke Maternal Grandmother     Diabetes Maternal Grandfather     Heart attack Maternal Grandfather     Hypertension Maternal Grandfather     Stroke Paternal Grandmother     Diabetes Cousin     Colon cancer Neg Hx        Prior to Admission Medications:  Medications Prior to Admission   Medication Sig Dispense Refill Last Dose/Taking    SUMAtriptan (Imitrex) 50 MG tablet Take 1 tablet by mouth 1 (One) Time As Needed for Migraine. Take one tablet at onset of headache. May repeat dose one time in 2 hours if headache not relieved. 9 tablet 2 Past Month    topiramate (Topamax) 25 MG tablet Take 1 tablet by mouth 2 (Two) Times a Day. 60 tablet 2 2025    fluticasone (FLONASE) 50 MCG/ACT nasal spray Administer 2 sprays into the nostril(s) as directed by provider Daily. (Patient not taking: Reported on 2025) 18.2 g 1 Unknown    Magnesium Oxide -Mg Supplement 500 MG tablet Take 1 tablet by mouth Daily. (Patient not taking: Reported on 2025) 30 tablet 3 Unknown        Allergies:  Allergies   Allergen Reactions    Penicillins Rash        Vitals: Temp:  [97.8 °F (36.6 °C)-98.1 °F (36.7 °C)] 98.1 °F (36.7 °C)  Heart Rate:  [] 101  Resp:  [14-16] 16  BP: (102-138)/(64-82) 138/82    Review Of Systems:  Constitutional:  Negative for chills, fever, and unexpected weight change.  Respiratory:  Negative for cough, chest tightness, shortness of breath, and wheezing.  Cardiovascular:  Negative for chest pain, palpitations, and leg swelling.  Gastrointestinal:  Negative for abdominal distention, abdominal pain, nausea, vomiting.  Neurological:  Negative for weakness, numbness, and headaches.     Physical Exam:    General Appearance:  Alert, cooperative, in no acute distress.   Lungs:   Clear to auscultation, respirations regular, even and                 unlabored.   Heart:  Regular rhythm and normal rate.   Abdomen:   Normal bowel sounds, no masses, no organomegaly. Soft, nontender, nondistended   Neurologic: Alert and oriented x 3. Moves all four limbs equally       Assessment & Plan     Assessment:  Active Problems:    Constipation    Diarrhea    Right sided abdominal pain    Anemia      Plan: ESOPHAGOGASTRODUODENOSCOPY (N/A), COLONOSCOPY (N/A)     Jory Velez MD  2/21/2025

## 2025-02-24 ENCOUNTER — PATIENT ROUNDING (BHMG ONLY) (OUTPATIENT)
Dept: NEUROLOGY | Facility: CLINIC | Age: 25
End: 2025-02-24
Payer: COMMERCIAL

## 2025-02-24 LAB — REF LAB TEST METHOD: NORMAL

## 2025-05-08 ENCOUNTER — PATIENT ROUNDING (BHMG ONLY) (OUTPATIENT)
Dept: URGENT CARE | Facility: CLINIC | Age: 25
End: 2025-05-08
Payer: COMMERCIAL

## 2025-06-02 ENCOUNTER — OFFICE VISIT (OUTPATIENT)
Dept: GASTROENTEROLOGY | Facility: CLINIC | Age: 25
End: 2025-06-02
Payer: COMMERCIAL

## 2025-06-02 VITALS
HEART RATE: 70 BPM | SYSTOLIC BLOOD PRESSURE: 110 MMHG | OXYGEN SATURATION: 100 % | DIASTOLIC BLOOD PRESSURE: 70 MMHG | BODY MASS INDEX: 24.29 KG/M2 | TEMPERATURE: 97.8 F | WEIGHT: 164 LBS | HEIGHT: 69 IN

## 2025-06-02 DIAGNOSIS — D64.9 ANEMIA, UNSPECIFIED TYPE: Chronic | ICD-10-CM

## 2025-06-02 DIAGNOSIS — K58.2 IRRITABLE BOWEL SYNDROME WITH BOTH CONSTIPATION AND DIARRHEA: Chronic | ICD-10-CM

## 2025-06-02 DIAGNOSIS — K92.9 FUNCTIONAL GASTROINTESTINAL DISORDER: Chronic | ICD-10-CM

## 2025-06-02 DIAGNOSIS — E80.4 GILBERT SYNDROME: Chronic | ICD-10-CM

## 2025-06-02 DIAGNOSIS — R19.7 DIARRHEA, UNSPECIFIED TYPE: Chronic | ICD-10-CM

## 2025-06-02 DIAGNOSIS — R10.9 RIGHT SIDED ABDOMINAL PAIN: Primary | Chronic | ICD-10-CM

## 2025-06-02 DIAGNOSIS — K59.00 CONSTIPATION, UNSPECIFIED CONSTIPATION TYPE: Chronic | ICD-10-CM

## 2025-06-02 DIAGNOSIS — R17 ELEVATED BILIRUBIN: Chronic | ICD-10-CM

## 2025-06-02 PROCEDURE — 99214 OFFICE O/P EST MOD 30 MIN: CPT | Performed by: NURSE PRACTITIONER

## 2025-06-02 RX ORDER — DICYCLOMINE HYDROCHLORIDE 10 MG/1
10 CAPSULE ORAL 2 TIMES DAILY PRN
Qty: 30 CAPSULE | Refills: 1 | Status: SHIPPED | OUTPATIENT
Start: 2025-06-02

## 2025-06-02 NOTE — PROGRESS NOTES
"     Follow Up Note     Date: 2025   Patient Name: Araseli Martins  MRN: [id number]  : 2000     Primary Care Provider: Karlene Monterroso APRN     Chief Complaint   Patient presents with    Abdominal Pain    Follow-up     2025  History of Present Illness  The patient is a 25-year-old female who is here for follow-up of abdominal pain.    She reports persistent abdominal discomfort, which she describes as crampy in nature. This discomfort is often accompanied by an urge to defecate, particularly after consuming certain foods. Eating at a restaurant almost always \"guarantees\" cramping and diarrhea. No history of GI bleeding.      Interval History:  7/10/2024  Araseli Martins is a 24 y.o. female who is here today for follow up for abdominal pain. She has continued to have right sided pain that has not changed. The pain is more on the lower right side. Bowel habits unchanged, more constipation than diarrhea. Denies nausea or vomiting. Denies reflux or difficulty swallowing.  Denies heavy menstrual cycles. Denies hematuria.     4/3/2024  Araseli Martins is a 24 y.o. female who is here today for follow up for abdominal pain. She has been having right side pain for the past year or so. It was constant for 3 months, but now comes and goes. She has not had the pain at all for the past month. She has not found anything that makes the pain better or worse. No nausea or vomiting. Denies reflux or difficulty swallowing. She has occasional constipation and can go 3 days without a bowel movement at times. She hasn't had any diarrhea episodes in a while. Denies overt GI bleeding, she had a trace amount of blood on the tissue about a month ago, but none since.      11/10/2021  She has nausea after eating, then abdominal cramping then diarrhea. This occurs about 50% of the time with fecal urgency. She has 1-2 BMs per day and typically formed other than with her episodes. She has been having these episodes for " about 5 years now, remained the same. Sometimes does have bright red blood on the tissue after a bowel movement, has had an anal fissure in the past diagnosed by her PCP. No current rectal pain. She has tried dicyclomine for relief of abdominal pain, has not seemed to help yet. Has had elevated bilirubin on her labs for years now. No prior EGD or colonoscopy. There is no known family history of colon cancer or colon polyps. Had recent labs with PCP recently which were all normal except for elevated bilirubin. No tobacco or marijuana use. Drinks alcohol socially in small amounts, no daily use. Had imaging of her liver in 2018 which was normal, no abdominal imaging since then.     Subjective      Past Medical History:   Diagnosis Date    Anemia     Anxiety     Gallstones     Iron deficiency     Irritable bowel syndrome     Ovarian cyst     Snores      Past Surgical History:   Procedure Laterality Date    CHOLECYSTECTOMY      at age 7, + stones    COLONOSCOPY N/A 02/21/2025    Procedure: COLONOSCOPY;  Surgeon: Jory Velez MD;  Location: [facility] ENDOSCOPY;  Service: Gastroenterology;  Laterality: N/A;    ENDOSCOPY N/A 02/21/2025    Procedure: ESOPHAGOGASTRODUODENOSCOPY with biopsy;  Surgeon: Jory Velez MD;  Location: [facility] ENDOSCOPY;  Service: Gastroenterology;  Laterality: N/A;    UPPER GASTROINTESTINAL ENDOSCOPY      WISDOM TOOTH EXTRACTION  2015     Family History   Problem Relation Age of Onset    Migraines Mother     Hypertension Father     Osteoporosis Maternal Grandmother     Stroke Maternal Grandmother     Diabetes Maternal Grandfather     Heart attack Maternal Grandfather     Hypertension Maternal Grandfather     Stroke Paternal Grandmother     Diabetes Cousin     Colon cancer Neg Hx      Social History     Socioeconomic History    Marital status: Single   Tobacco Use    Smoking status: Never     Passive exposure: Never    Smokeless tobacco: Never   Vaping Use    Vaping status: Never Used  "  Substance and Sexual Activity    Alcohol use: Not Currently     Comment: social    Drug use: Never    Sexual activity: Defer     Partners: Male     Birth control/protection: Condom       Current Outpatient Medications:     dicyclomine (BENTYL) 10 MG capsule, Take 1 capsule by mouth 2 (Two) Times a Day As Needed for Abdominal Cramping., Disp: 30 capsule, Rfl: 1    Allergies   Allergen Reactions    Penicillins Rash     The following portions of the patient's history were reviewed and updated as appropriate: allergies, current medications, past family history, past medical history, past social history, past surgical history and problem list.    Objective     Physical Exam  Vitals and nursing note reviewed.   Constitutional:       General: She is not in acute distress.     Appearance: Normal appearance. She is well-developed.   HENT:      Head: Normocephalic and atraumatic.      Mouth/Throat:      Mouth: Mucous membranes are not pale, not dry and not cyanotic.   Eyes:      General: Lids are normal.   Neck:      Trachea: Trachea normal.   Cardiovascular:      Rate and Rhythm: Normal rate.   Pulmonary:      Effort: Pulmonary effort is normal. No respiratory distress.      Breath sounds: Normal breath sounds.   Abdominal:      General: Bowel sounds are normal.      Palpations: Abdomen is soft.      Tenderness: There is no abdominal tenderness.   Skin:     General: Skin is warm and dry.   Neurological:      Mental Status: She is alert and oriented to person, place, and time.   Psychiatric:         Mood and Affect: Mood normal.         Speech: Speech normal.         Behavior: Behavior normal. Behavior is cooperative.       Vitals:    06/02/25 1525   BP: 110/70   Pulse: 70   Temp: 97.8 °F (36.6 °C)   TempSrc: Infrared   SpO2: 100%   Weight: 74.4 kg (164 lb)   Height: 175.3 cm (69\")     Body mass index is 24.22 kg/m².     Results Review:   I reviewed the patient's new clinical results.    Admission on 05/08/2025, Discharged " on 05/08/2025   Component Date Value Ref Range Status    Rapid Strep A Screen 05/08/2025 Positive (A)   Final    Internal Control 05/08/2025 Passed   Final    Lot Number 05/08/2025 [id number]   Final    Expiration Date 05/08/2025 3/19/27   Final     Iron and TIBC (07/11/2023 09:14)  Ferritin (07/11/2023 09:14)  Vitamin B12 (07/11/2023 09:14)  TSH (07/11/2023 09:14)  Bilirubin, Total & Direct (07/11/2023 09:14)    Comprehensive Metabolic Panel (04/18/2024 15:35)  CBC (No Diff) (04/18/2024 15:35)  H. Pylori Breath Test - Breath, Lung (04/18/2024 15:35)  Protime-INR (04/18/2024 15:35)  Hepatitis A Antibody, Total (04/18/2024 15:35)  Hepatitis B Surface Antibody (04/18/2024 15:35)  Hepatitis B Surface Antigen (04/18/2024 15:35)  Hepatitis B Core Antibody, Total (04/18/2024 15:35)  Hepatitis C Antibody (04/18/2024 15:35)  CARLYN (04/18/2024 15:35)  Anti-Smooth Muscle Antibody Titer (04/18/2024 15:35)  Mitochondrial Antibodies, M2 (04/18/2024 15:35)     US Liver   4/24/2018  Normal examination. The gallbladder is surgically absent,  but the pancreas, liver, biliary ductal system and right kidney are  ultrasonographically normal.    CT Abdomen Pelvis With Contrast     Result Date: 3/7/2024  Impression: 1.No acute abnormality identified within the abdomen or pelvis. 2.Normal appendix. 3.Moderate colonic stool. 4.3.2 cm left adnexal cyst. Mild pelvic free fluid, possibly physiologic. 5.Additional findings as detailed above.    The liver is unremarkable in morphology. No focal liver lesion is seen. No biliary dilation is seen.     EGD dated 2/21/2025 per Dr. Velez  - Normal oropharynx.  - Z-line regular, 39 cm from the incisors.  - No gross lesions in the entire esophagus.  - Bilious gastric fluid. Likely following lap sabino  - Erythematous mucosa in the posterior wall of the stomach, antrum and prepyloric region of the stomach. Biopsied.  - Normal ampulla, duodenal bulb, first portion of the duodenum, second portion of the  duodenum and third portion of the duodenum. Biopsied.  - No endoscopic findings that could explain her symptoms  A.   ANTRUM AND BODY, BIOPSY:  Gastric mucosa with reactive changes  No Helicobacter pylori-like organisms seen  Negative for dysplasia or malignancy  B.   DUODENUM, BIOPSY:  Small bowel mucosa with no significant pathologic change  Negative for significantly increased intraepithelial lymphocytes     Colonoscopy dated 2/21/2025 per Dr. Velez  - The appendiceal orifice, rectum, sigmoid colon, descending colon, splenic flexure, transverse colon, hepatic flexure, ascending colon, cecum, recto-sigmoid colon and ileocecal valve are normal. Biopsied.  - The examined portion of the ileum was normal. Biopsied.  - No endoscopic signs of colitis or ileitis  C.   TERMINAL ILEUM BIOPSY:   Small bowel mucosa with no significant pathologic change   D.   RANDOM COLON BIOPSIES, LEFT, RIGHT, AND TRANSVERSE:   Colonic mucosa with no significant pathologic change     Assessment / Plan      1. Right sided abdominal pain  2. Constipation, unspecified constipation type  3. Diarrhea, unspecified type  4. Functional gastrointestinal disorder  5. Irritable bowel syndrome with both constipation and diarrhea  6. Anemia, unspecified type  Symptoms unchanged, comes and goes for many years.  Eating certain foods make symptoms worse.  Eating at a restaurant typically causes cramping with diarrhea.  No history of GI bleeding.  H. pylori breath test negative.  Celiac panel normal.  Previous abdominal ultrasound unremarkable. CTAP dated 3/7/2024 with moderate colonic stool burden, otherwise unremarkable.  EGD dated 2/21/2025 with bilious gastric fluid, likely following lap sabino, biopsies unremarkable.  Colonoscopy dated 2/21/2025 with no endoscopic signs of colitis or ileitis, terminal ileum and random biopsies unremarkable.  Suspect functional gastrointestinal disorder/IBS mixed.     Low FODMAP diet  Metamucil or fiber Gummies  daily.  MiraLAX or stool softeners daily as needed for constipation.  Imodium as needed for diarrhea.  Bentyl as needed for cramping.  Labs  Fecal calprotectin  If iron deficiency and elevated fecal calprotectin, recommend small bowel PillCam.  Discussed with patient.    - dicyclomine (BENTYL) 10 MG capsule; Take 1 capsule by mouth 2 (Two) Times a Day As Needed for Abdominal Cramping.  Dispense: 30 capsule; Refill: 1  - Calprotectin, Fecal - Stool, Per Rectum; Future  - CBC (No Diff); Future  - Iron Profile w/o Ferritin; Future  - Ferritin; Future  - Vitamin B12; Future    7. Elevated bilirubin  8. Gilbert syndrome  She has a history of elevated bilirubin dating back to 2018 per chart.  Direct bilirubin unremarkable in 2018, 2021, 2023, and in 2024.  CTAP dated 3/7/2024 with liver unremarkable.  She is negative for hepatitis B and hepatitis C.  She is immune to hepatitis A.  CARLYN negative.  Smooth muscle antibody normal.  Mitochondrial antibody normal. CTAP dated 3/7/2024 with liver unremarkable. Likely Gilbert's syndrome.  No treatment needed for Gilbert's syndrome.  She is not immune to hepatitis B and may obtain vaccines through the health department or PCP office.  Labs    Patient Instructions   High fiber, low fat diet with liberal water intake.   Low FODMAP diet - avoid all dairy. May use lactose free/dairy free alternatives such as almond milk, rice milk, oat milk, etc.   Metamucil 1 packet/scoop or or fiber gummies 2-4 per day.   Dicyclomine 10 mg 1 po twice a day as needed for abdominal cramping. Advised this can worsen constipation.   May use Miralax 17 grams daily as needed for constipation.   May use stool softeners 2 per day as needed for constipation.   Imodium 2 mg /2-1/ tablet 1-2 times per day as needed for diarrhea. Adjust to have 1-2 soft bowel movements per day.   Labs  Stool study  If iron deficiency and elevated fecal calprotectin - recommend small bowel pillcam.   The patient is not immune to  hepatitis B and may obtain vaccines through the health department or PCP office if not already undertaken.   Follow up: 6 months          Low-FODMAP Eating Plan    FODMAP stands for fermentable oligosaccharides, disaccharides, monosaccharides, and polyols. These are sugars that are hard for some people to digest. A low-FODMAP eating plan may help some people who have irritable bowel syndrome (IBS) and certain other bowel (intestinal) diseases to manage their symptoms.  This meal plan can be complicated to follow. Work with a diet and nutrition specialist (dietitian) to make a low-FODMAP eating plan that is right for you. A dietitian can help make sure that you get enough nutrition from this diet.  What are tips for following this plan?  Reading food labels  Check labels for hidden FODMAPs such as:  High-fructose syrup.  Honey.  Agave.  Natural fruit flavors.  Onion or garlic powder.  Choose low-FODMAP foods that contain 3-4 grams of fiber per serving.  Check food labels for serving sizes. Eat only one serving at a time to make sure FODMAP levels stay low.  Shopping  Shop with a list of foods that are recommended on this diet and make a meal plan.  Meal planning  Follow a low-FODMAP eating plan for up to 6 weeks, or as told by your health care provider or dietitian.  To follow the eating plan:  Eliminate high-FODMAP foods from your diet completely. Choose only low-FODMAP foods to eat. You will do this for 2-6 weeks.  Gradually reintroduce high-FODMAP foods into your diet one at a time. Most people should wait a few days before introducing the next new high-FODMAP food into their meal plan. Your dietitian can recommend how quickly you may reintroduce foods.  Keep a daily record of what and how much you eat and drink. Make note of any symptoms that you have after eating.  Review your daily record with a dietitian regularly to identify which foods you can eat and which foods you should avoid.  General tips  Drink enough  "fluid each day to keep your urine pale yellow.  Avoid processed foods. These often have added sugar and may be high in FODMAPs.  Avoid most dairy products, whole grains, and sweeteners.  Work with a dietitian to make sure you get enough fiber in your diet.  Avoid high FODMAP foods at meals to manage symptoms.     Recommended foods  Fruits  Bananas, oranges, tangerines, flako, limes, blueberries, raspberries, strawberries, grapes, cantaloupe, honeydew melon, kiwi, papaya, passion fruit, and pineapple. Limited amounts of dried cranberries, banana chips, and shredded coconut.  Vegetables  Eggplant, zucchini, cucumber, peppers, green beans, bean sprouts, lettuce, arugula, kale, Swiss chard, spinach, antoinette greens, bok malina, summer squash, potato, and tomato. Limited amounts of corn, carrot, and sweet potato. Green parts of scallions.  Grains  Gluten-free grains, such as rice, oats, buckwheat, quinoa, corn, polenta, and millet. Gluten-free pasta, bread, or cereal. Rice noodles. Corn tortillas.  Meats and other proteins  Unseasoned beef, pork, poultry, or fish. Eggs. Sharpe. Tofu (firm) and tempeh. Limited amounts of nuts and seeds, such as almonds, walnuts, brazil nuts, pecans, peanuts, nut butters, pumpkin seeds, daily seeds, and sunflower seeds.  Dairy  Lactose-free milk, yogurt, and kefir. Lactose-free cottage cheese and ice cream. Non-dairy milks, such as almond, coconut, hemp, and rice milk. Non-dairy yogurt. Limited amounts of goat cheese, brie, mozzarella, parmesan, swiss, and other hard cheeses.  Fats and oils  Butter-free spreads. Vegetable oils, such as olive, canola, and sunflower oil.  Seasoning and other foods  Artificial sweeteners with names that do not end in \"ol,\" such as aspartame, saccharine, and stevia. Maple syrup, white table sugar, raw sugar, brown sugar, and molasses. Mayonnaise, soy sauce, and tamari. Fresh basil, coriander, parsley, rosemary, and thyme.  Beverages  Water and mineral water. " Sugar-sweetened soft drinks. Small amounts of orange juice or cranberry juice. Black and green tea. Most dry verna. Coffee.  The items listed above may not be a complete list of foods and beverages you can eat. Contact a dietitian for more information.     Foods to avoid  Fruits  Fresh, dried, and juiced forms of apple, pear, watermelon, peach, plum, cherries, apricots, blackberries, boysenberries, figs, nectarines, and abhishek. Avocado.  Vegetables  Chicory root, artichoke, asparagus, cabbage, snow peas, [place] sprouts, broccoli, sugar snap peas, mushrooms, celery, and cauliflower. Onions, garlic, leeks, and the white part of scallions.  Grains  Wheat, including kamut, durum, and semolina. Barley and bulgur. Couscous. Wheat-based cereals. Wheat noodles, bread, crackers, and pastries.  Meats and other proteins  Fried or fatty meat. Sausage. Cashews and pistachios. Soybeans, baked beans, black beans, chickpeas, kidney beans, luna beans, navy beans, lentils, black-eyed peas, and split peas.  Dairy  Milk, yogurt, ice cream, and soft cheese. Cream and sour cream. Milk-based sauces. Custard. Buttermilk. Soy milk.  Seasoning and other foods  Any sugar-free gum or candy. Foods that contain artificial sweeteners such as sorbitol, mannitol, isomalt, or xylitol. Foods that contain honey, high-fructose corn syrup, or agave. Bouillon, vegetable stock, beef stock, and chicken stock. Garlic and onion powder. Condiments made with onion, such as hummus, chutney, pickles, relish, salad dressing, and salsa. Tomato paste.  Beverages  Chicory-based drinks. Coffee substitutes. Chamomile tea. Fennel tea. Sweet or fortified verna such as port or amari. Diet soft drinks made with isomalt, mannitol, maltitol, sorbitol, or xylitol. Apple, pear, and abhishek juice. Juices with high-fructose corn syrup.  The items listed above may not be a complete list of foods and beverages you should avoid. Contact a dietitian for more information.      Summary  FODMAP stands for fermentable oligosaccharides, disaccharides, monosaccharides, and polyols. These are sugars that are hard for some people to digest.  A low-FODMAP eating plan is a short-term diet that helps to ease symptoms of certain bowel diseases.  The eating plan usually lasts up to 6 weeks. After that, high-FODMAP foods are reintroduced gradually and one at a time. This can help you find out which foods may be causing symptoms.  A low-FODMAP eating plan can be complicated. It is best to work with a dietitian who has experience with this type of plan.  This information is not intended to replace advice given to you by your health care provider. Make sure you discuss any questions you have with your health care provider.  Document Revised: 05/06/2021 Document Reviewed: 05/06/2021  [employer] Patient Education © 2023 [employer]     KEVIN Bryant  6/2/2025    Please note that portions of this note were completed with a voice recognition program.     Patient or patient representative verbalized consent for the use of Ambient Listening during the visit with  KEVIN Bryant for chart documentation. 6/2/2025  16:01 EDT

## 2025-06-02 NOTE — PATIENT INSTRUCTIONS
High fiber, low fat diet with liberal water intake.   Low FODMAP diet - avoid all dairy. May use lactose free/dairy free alternatives such as almond milk, rice milk, oat milk, etc.   Metamucil 1 packet/scoop or or fiber gummies 2-4 per day.   Dicyclomine 10 mg 1 po twice a day as needed for abdominal cramping. Advised this can worsen constipation.   May use Miralax 17 grams daily as needed for constipation.   May use stool softeners 2 per day as needed for constipation.   Imodium 2 mg /2-1/ tablet 1-2 times per day as needed for diarrhea. Adjust to have 1-2 soft bowel movements per day.   Labs  Stool study  If iron deficiency and elevated fecal calprotectin - recommend small bowel pillcam.   The patient is not immune to hepatitis B and may obtain vaccines through the health department or PCP office if not already undertaken.   Follow up: 6 months          Low-FODMAP Eating Plan    FODMAP stands for fermentable oligosaccharides, disaccharides, monosaccharides, and polyols. These are sugars that are hard for some people to digest. A low-FODMAP eating plan may help some people who have irritable bowel syndrome (IBS) and certain other bowel (intestinal) diseases to manage their symptoms.  This meal plan can be complicated to follow. Work with a diet and nutrition specialist (dietitian) to make a low-FODMAP eating plan that is right for you. A dietitian can help make sure that you get enough nutrition from this diet.  What are tips for following this plan?  Reading food labels  Check labels for hidden FODMAPs such as:  High-fructose syrup.  Honey.  Agave.  Natural fruit flavors.  Onion or garlic powder.  Choose low-FODMAP foods that contain 3-4 grams of fiber per serving.  Check food labels for serving sizes. Eat only one serving at a time to make sure FODMAP levels stay low.  Shopping  Shop with a list of foods that are recommended on this diet and make a meal plan.  Meal planning  Follow a low-FODMAP eating plan for up  to 6 weeks, or as told by your health care provider or dietitian.  To follow the eating plan:  Eliminate high-FODMAP foods from your diet completely. Choose only low-FODMAP foods to eat. You will do this for 2-6 weeks.  Gradually reintroduce high-FODMAP foods into your diet one at a time. Most people should wait a few days before introducing the next new high-FODMAP food into their meal plan. Your dietitian can recommend how quickly you may reintroduce foods.  Keep a daily record of what and how much you eat and drink. Make note of any symptoms that you have after eating.  Review your daily record with a dietitian regularly to identify which foods you can eat and which foods you should avoid.  General tips  Drink enough fluid each day to keep your urine pale yellow.  Avoid processed foods. These often have added sugar and may be high in FODMAPs.  Avoid most dairy products, whole grains, and sweeteners.  Work with a dietitian to make sure you get enough fiber in your diet.  Avoid high FODMAP foods at meals to manage symptoms.     Recommended foods  Fruits  Bananas, oranges, tangerines, flako, limes, blueberries, raspberries, strawberries, grapes, cantaloupe, honeydew melon, kiwi, papaya, passion fruit, and pineapple. Limited amounts of dried cranberries, banana chips, and shredded coconut.  Vegetables  Eggplant, zucchini, cucumber, peppers, green beans, bean sprouts, lettuce, arugula, kale, Swiss chard, spinach, antoinette greens, bok malina, summer squash, potato, and tomato. Limited amounts of corn, carrot, and sweet potato. Green parts of scallions.  Grains  Gluten-free grains, such as rice, oats, buckwheat, quinoa, corn, polenta, and millet. Gluten-free pasta, bread, or cereal. Rice noodles. Corn tortillas.  Meats and other proteins  Unseasoned beef, pork, poultry, or fish. Eggs. Sharpe. Tofu (firm) and tempeh. Limited amounts of nuts and seeds, such as almonds, walnuts, brazil nuts, pecans, peanuts, nut butters,  "pumpkin seeds, daily seeds, and sunflower seeds.  Dairy  Lactose-free milk, yogurt, and kefir. Lactose-free cottage cheese and ice cream. Non-dairy milks, such as almond, coconut, hemp, and rice milk. Non-dairy yogurt. Limited amounts of goat cheese, brie, mozzarella, parmesan, swiss, and other hard cheeses.  Fats and oils  Butter-free spreads. Vegetable oils, such as olive, canola, and sunflower oil.  Seasoning and other foods  Artificial sweeteners with names that do not end in \"ol,\" such as aspartame, saccharine, and stevia. Maple syrup, white table sugar, raw sugar, brown sugar, and molasses. Mayonnaise, soy sauce, and tamari. Fresh basil, coriander, parsley, rosemary, and thyme.  Beverages  Water and mineral water. Sugar-sweetened soft drinks. Small amounts of orange juice or cranberry juice. Black and green tea. Most dry verna. Coffee.  The items listed above may not be a complete list of foods and beverages you can eat. Contact a dietitian for more information.     Foods to avoid  Fruits  Fresh, dried, and juiced forms of apple, pear, watermelon, peach, plum, cherries, apricots, blackberries, boysenberries, figs, nectarines, and abhishek. Avocado.  Vegetables  Chicory root, artichoke, asparagus, cabbage, snow peas, [place] sprouts, broccoli, sugar snap peas, mushrooms, celery, and cauliflower. Onions, garlic, leeks, and the white part of scallions.  Grains  Wheat, including kamut, durum, and semolina. Barley and bulgur. Couscous. Wheat-based cereals. Wheat noodles, bread, crackers, and pastries.  Meats and other proteins  Fried or fatty meat. Sausage. Cashews and pistachios. Soybeans, baked beans, black beans, chickpeas, kidney beans, luna beans, navy beans, lentils, black-eyed peas, and split peas.  Dairy  Milk, yogurt, ice cream, and soft cheese. Cream and sour cream. Milk-based sauces. Custard. Buttermilk. Soy milk.  Seasoning and other foods  Any sugar-free gum or candy. Foods that contain artificial " sweeteners such as sorbitol, mannitol, isomalt, or xylitol. Foods that contain honey, high-fructose corn syrup, or agave. Bouillon, vegetable stock, beef stock, and chicken stock. Garlic and onion powder. Condiments made with onion, such as hummus, chutney, pickles, relish, salad dressing, and salsa. Tomato paste.  Beverages  Chicory-based drinks. Coffee substitutes. Chamomile tea. Fennel tea. Sweet or fortified verna such as port or amari. Diet soft drinks made with isomalt, mannitol, maltitol, sorbitol, or xylitol. Apple, pear, and abhishek juice. Juices with high-fructose corn syrup.  The items listed above may not be a complete list of foods and beverages you should avoid. Contact a dietitian for more information.     Summary  FODMAP stands for fermentable oligosaccharides, disaccharides, monosaccharides, and polyols. These are sugars that are hard for some people to digest.  A low-FODMAP eating plan is a short-term diet that helps to ease symptoms of certain bowel diseases.  The eating plan usually lasts up to 6 weeks. After that, high-FODMAP foods are reintroduced gradually and one at a time. This can help you find out which foods may be causing symptoms.  A low-FODMAP eating plan can be complicated. It is best to work with a dietitian who has experience with this type of plan.  This information is not intended to replace advice given to you by your health care provider. Make sure you discuss any questions you have with your health care provider.  Document Revised: 05/06/2021 Document Reviewed: 05/06/2021  [employer] Patient Education © 2023 [employer]

## (undated) DEVICE — VLV SXN AIR/H2O ORCAPOD3 1P/U STRL

## (undated) DEVICE — ENDOSCOPY PORT CONNECTOR FOR OLYMPUS® SCOPES: Brand: ERBE

## (undated) DEVICE — Device

## (undated) DEVICE — FRCP BX RADJAW4 NDL 2.8 240 STD OG

## (undated) DEVICE — HYBRID CO2 TUBING/CAP SET FOR OLYMPUS® SCOPES & CO2 SOURCE: Brand: ERBE

## (undated) DEVICE — LUBE JELLY PK/2.75GM STRL BX/144

## (undated) DEVICE — CONMED SCOPE SAVER BITE BLOCK, 20X27 MM: Brand: SCOPE SAVER